# Patient Record
Sex: MALE | Race: WHITE | Employment: UNEMPLOYED | URBAN - METROPOLITAN AREA
[De-identification: names, ages, dates, MRNs, and addresses within clinical notes are randomized per-mention and may not be internally consistent; named-entity substitution may affect disease eponyms.]

---

## 2019-03-18 ENCOUNTER — OFFICE VISIT (OUTPATIENT)
Dept: PRIMARY CARE CLINIC | Age: 29
End: 2019-03-18

## 2019-03-18 VITALS
HEART RATE: 109 BPM | DIASTOLIC BLOOD PRESSURE: 70 MMHG | SYSTOLIC BLOOD PRESSURE: 154 MMHG | RESPIRATION RATE: 14 BRPM | HEIGHT: 67 IN | OXYGEN SATURATION: 96 % | BODY MASS INDEX: 26.68 KG/M2 | WEIGHT: 170 LBS | TEMPERATURE: 98.6 F

## 2019-03-18 DIAGNOSIS — R11.2 NON-INTRACTABLE VOMITING WITH NAUSEA, UNSPECIFIED VOMITING TYPE: ICD-10-CM

## 2019-03-18 DIAGNOSIS — R68.89 FLU-LIKE SYMPTOMS: Primary | ICD-10-CM

## 2019-03-18 DIAGNOSIS — K21.9 GASTROESOPHAGEAL REFLUX DISEASE, ESOPHAGITIS PRESENCE NOT SPECIFIED: ICD-10-CM

## 2019-03-18 DIAGNOSIS — R05.9 COUGH: ICD-10-CM

## 2019-03-18 LAB
INFLUENZA A ANTIBODY: NORMAL
INFLUENZA B ANTIBODY: NORMAL
S PYO AG THROAT QL: NORMAL

## 2019-03-18 PROCEDURE — 87804 INFLUENZA ASSAY W/OPTIC: CPT | Performed by: NURSE PRACTITIONER

## 2019-03-18 PROCEDURE — 87880 STREP A ASSAY W/OPTIC: CPT | Performed by: NURSE PRACTITIONER

## 2019-03-18 PROCEDURE — 99203 OFFICE O/P NEW LOW 30 MIN: CPT | Performed by: NURSE PRACTITIONER

## 2019-03-18 RX ORDER — AZITHROMYCIN 250 MG/1
TABLET, FILM COATED ORAL
Qty: 6 TABLET | Refills: 0 | Status: SHIPPED | OUTPATIENT
Start: 2019-03-18 | End: 2019-03-23

## 2019-03-18 RX ORDER — FAMOTIDINE 40 MG/1
40 TABLET, FILM COATED ORAL DAILY
Qty: 30 TABLET | Refills: 3 | Status: SHIPPED | OUTPATIENT
Start: 2019-03-18

## 2019-03-18 RX ORDER — ONDANSETRON 4 MG/1
4 TABLET, FILM COATED ORAL EVERY 8 HOURS PRN
Qty: 9 TABLET | Refills: 0 | Status: CANCELLED | OUTPATIENT
Start: 2019-03-18 | End: 2019-03-21

## 2019-03-18 RX ORDER — DEXTROMETHORPHAN HYDROBROMIDE AND PROMETHAZINE HYDROCHLORIDE 15; 6.25 MG/5ML; MG/5ML
5 SYRUP ORAL 4 TIMES DAILY PRN
Qty: 118 ML | Refills: 0 | Status: SHIPPED | OUTPATIENT
Start: 2019-03-18 | End: 2019-03-23

## 2019-03-18 ASSESSMENT — ENCOUNTER SYMPTOMS
CHEST TIGHTNESS: 0
TROUBLE SWALLOWING: 0
FACIAL SWELLING: 0
NAUSEA: 1
HEMOPTYSIS: 0
STRIDOR: 0
BACK PAIN: 0
RECTAL PAIN: 0
DIARRHEA: 1
ANAL BLEEDING: 0
HEARTBURN: 0
EYE DISCHARGE: 0
EYE PAIN: 0
BLOOD IN STOOL: 0
EYE ITCHING: 0
SHORTNESS OF BREATH: 0
SORE THROAT: 0
PHOTOPHOBIA: 0
CONSTIPATION: 0
ABDOMINAL PAIN: 1
RHINORRHEA: 0
SINUS PRESSURE: 0
EYE REDNESS: 0
WHEEZING: 0
VOMITING: 1
VOICE CHANGE: 0
SINUS PAIN: 0
COUGH: 1
GLOBUS SENSATION: 1
ABDOMINAL DISTENTION: 0

## 2019-03-18 ASSESSMENT — PATIENT HEALTH QUESTIONNAIRE - PHQ9
1. LITTLE INTEREST OR PLEASURE IN DOING THINGS: 0
2. FEELING DOWN, DEPRESSED OR HOPELESS: 0
SUM OF ALL RESPONSES TO PHQ QUESTIONS 1-9: 0
SUM OF ALL RESPONSES TO PHQ QUESTIONS 1-9: 0
SUM OF ALL RESPONSES TO PHQ9 QUESTIONS 1 & 2: 0

## 2019-11-19 ENCOUNTER — HOSPITAL ENCOUNTER (INPATIENT)
Age: 29
LOS: 2 days | Discharge: HOME | DRG: 773 | End: 2019-11-22
Attending: PSYCHIATRY & NEUROLOGY | Admitting: PSYCHIATRY & NEUROLOGY
Payer: COMMERCIAL

## 2019-11-19 DIAGNOSIS — F19.94 SUBSTANCE INDUCED MOOD DISORDER (HCC): Primary | ICD-10-CM

## 2019-11-19 LAB
ACETAMINOPHEN LEVEL: <5 UG/ML (ref 10–30)
ALBUMIN SERPL-MCNC: 4.7 G/DL (ref 3.5–4.6)
ALP BLD-CCNC: 83 U/L (ref 35–104)
ALT SERPL-CCNC: 26 U/L (ref 0–41)
AMPHETAMINE SCREEN, URINE: ABNORMAL
ANION GAP SERPL CALCULATED.3IONS-SCNC: 11 MEQ/L (ref 9–15)
AST SERPL-CCNC: 20 U/L (ref 0–40)
BARBITURATE SCREEN URINE: ABNORMAL
BASOPHILS ABSOLUTE: 0 K/UL (ref 0–0.2)
BASOPHILS RELATIVE PERCENT: 0.6 %
BENZODIAZEPINE SCREEN, URINE: ABNORMAL
BILIRUB SERPL-MCNC: 0.3 MG/DL (ref 0.2–0.7)
BILIRUBIN URINE: NEGATIVE
BLOOD, URINE: NEGATIVE
BUN BLDV-MCNC: 11 MG/DL (ref 6–20)
CALCIUM SERPL-MCNC: 9.1 MG/DL (ref 8.5–9.9)
CANNABINOID SCREEN URINE: POSITIVE
CHLORIDE BLD-SCNC: 102 MEQ/L (ref 95–107)
CHOLESTEROL, TOTAL: 160 MG/DL (ref 0–199)
CLARITY: CLEAR
CO2: 25 MEQ/L (ref 20–31)
COCAINE METABOLITE SCREEN URINE: POSITIVE
COLOR: YELLOW
CREAT SERPL-MCNC: 0.89 MG/DL (ref 0.7–1.2)
EOSINOPHILS ABSOLUTE: 0.2 K/UL (ref 0–0.7)
EOSINOPHILS RELATIVE PERCENT: 2.6 %
ETHANOL PERCENT: NORMAL G/DL
ETHANOL: <10 MG/DL (ref 0–0.08)
GFR AFRICAN AMERICAN: >60
GFR NON-AFRICAN AMERICAN: >60
GLOBULIN: 2.6 G/DL (ref 2.3–3.5)
GLUCOSE BLD-MCNC: 112 MG/DL (ref 70–99)
GLUCOSE URINE: NEGATIVE MG/DL
HCT VFR BLD CALC: 42.5 % (ref 42–52)
HDLC SERPL-MCNC: 41 MG/DL (ref 40–59)
HEMOGLOBIN: 14.1 G/DL (ref 14–18)
KETONES, URINE: NEGATIVE MG/DL
LDL CHOLESTEROL CALCULATED: 100 MG/DL (ref 0–129)
LEUKOCYTE ESTERASE, URINE: NEGATIVE
LYMPHOCYTES ABSOLUTE: 2.2 K/UL (ref 1–4.8)
LYMPHOCYTES RELATIVE PERCENT: 34.2 %
Lab: ABNORMAL
MCH RBC QN AUTO: 31 PG (ref 27–31.3)
MCHC RBC AUTO-ENTMCNC: 33.2 % (ref 33–37)
MCV RBC AUTO: 93.2 FL (ref 80–100)
METHADONE SCREEN, URINE: ABNORMAL
MONOCYTES ABSOLUTE: 0.6 K/UL (ref 0.2–0.8)
MONOCYTES RELATIVE PERCENT: 8.9 %
NEUTROPHILS ABSOLUTE: 3.4 K/UL (ref 1.4–6.5)
NEUTROPHILS RELATIVE PERCENT: 53.7 %
NITRITE, URINE: NEGATIVE
OPIATE SCREEN URINE: ABNORMAL
OXYCODONE URINE: POSITIVE
PDW BLD-RTO: 13.5 % (ref 11.5–14.5)
PH UA: 7 (ref 5–9)
PHENCYCLIDINE SCREEN URINE: ABNORMAL
PLATELET # BLD: 202 K/UL (ref 130–400)
POTASSIUM SERPL-SCNC: 3.6 MEQ/L (ref 3.4–4.9)
PROPOXYPHENE SCREEN: ABNORMAL
PROTEIN UA: NEGATIVE MG/DL
RBC # BLD: 4.56 M/UL (ref 4.7–6.1)
SALICYLATE, SERUM: <0.3 MG/DL (ref 15–30)
SODIUM BLD-SCNC: 138 MEQ/L (ref 135–144)
SPECIFIC GRAVITY UA: 1.02 (ref 1–1.03)
TOTAL CK: 114 U/L (ref 0–190)
TOTAL PROTEIN: 7.3 G/DL (ref 6.3–8)
TRIGL SERPL-MCNC: 96 MG/DL (ref 0–150)
TSH SERPL DL<=0.05 MIU/L-ACNC: 2.26 UIU/ML (ref 0.44–3.86)
URINE REFLEX TO CULTURE: NORMAL
UROBILINOGEN, URINE: 0.2 E.U./DL
WBC # BLD: 6.4 K/UL (ref 4.8–10.8)

## 2019-11-19 PROCEDURE — G0480 DRUG TEST DEF 1-7 CLASSES: HCPCS

## 2019-11-19 PROCEDURE — 84443 ASSAY THYROID STIM HORMONE: CPT

## 2019-11-19 PROCEDURE — 99285 EMERGENCY DEPT VISIT HI MDM: CPT

## 2019-11-19 PROCEDURE — 80053 COMPREHEN METABOLIC PANEL: CPT

## 2019-11-19 PROCEDURE — 80307 DRUG TEST PRSMV CHEM ANLYZR: CPT

## 2019-11-19 PROCEDURE — 82550 ASSAY OF CK (CPK): CPT

## 2019-11-19 PROCEDURE — 81003 URINALYSIS AUTO W/O SCOPE: CPT

## 2019-11-19 PROCEDURE — 36415 COLL VENOUS BLD VENIPUNCTURE: CPT

## 2019-11-19 PROCEDURE — 85025 COMPLETE CBC W/AUTO DIFF WBC: CPT

## 2019-11-19 PROCEDURE — 80061 LIPID PANEL: CPT

## 2019-11-19 ASSESSMENT — PATIENT HEALTH QUESTIONNAIRE - PHQ9: SUM OF ALL RESPONSES TO PHQ QUESTIONS 1-9: 15

## 2019-11-20 PROBLEM — F19.94 SUBSTANCE INDUCED MOOD DISORDER (HCC): Status: ACTIVE | Noted: 2019-11-20

## 2019-11-20 LAB
EKG ATRIAL RATE: 56 BPM
EKG P AXIS: 55 DEGREES
EKG P-R INTERVAL: 118 MS
EKG Q-T INTERVAL: 398 MS
EKG QRS DURATION: 90 MS
EKG QTC CALCULATION (BAZETT): 384 MS
EKG R AXIS: 81 DEGREES
EKG T AXIS: 42 DEGREES
EKG VENTRICULAR RATE: 56 BPM

## 2019-11-20 PROCEDURE — 99223 1ST HOSP IP/OBS HIGH 75: CPT | Performed by: PSYCHIATRY & NEUROLOGY

## 2019-11-20 PROCEDURE — 6370000000 HC RX 637 (ALT 250 FOR IP): Performed by: PSYCHIATRY & NEUROLOGY

## 2019-11-20 PROCEDURE — 1240000000 HC EMOTIONAL WELLNESS R&B

## 2019-11-20 PROCEDURE — 93005 ELECTROCARDIOGRAM TRACING: CPT | Performed by: PSYCHIATRY & NEUROLOGY

## 2019-11-20 RX ORDER — CLONIDINE HYDROCHLORIDE 0.1 MG/1
0.1 TABLET ORAL EVERY 8 HOURS
Status: DISCONTINUED | OUTPATIENT
Start: 2019-11-23 | End: 2019-11-20

## 2019-11-20 RX ORDER — ACETAMINOPHEN 325 MG/1
650 TABLET ORAL EVERY 6 HOURS PRN
Status: DISCONTINUED | OUTPATIENT
Start: 2019-11-20 | End: 2019-11-20 | Stop reason: ALTCHOICE

## 2019-11-20 RX ORDER — CLONIDINE HYDROCHLORIDE 0.1 MG/1
0.1 TABLET ORAL EVERY 4 HOURS
Status: DISCONTINUED | OUTPATIENT
Start: 2019-11-20 | End: 2019-11-20

## 2019-11-20 RX ORDER — HYDROXYZINE PAMOATE 50 MG/1
50 CAPSULE ORAL EVERY 6 HOURS PRN
Status: DISCONTINUED | OUTPATIENT
Start: 2019-11-20 | End: 2019-11-22 | Stop reason: HOSPADM

## 2019-11-20 RX ORDER — HALOPERIDOL 5 MG
5 TABLET ORAL EVERY 6 HOURS PRN
Status: DISCONTINUED | OUTPATIENT
Start: 2019-11-20 | End: 2019-11-22 | Stop reason: HOSPADM

## 2019-11-20 RX ORDER — HYDROXYZINE HYDROCHLORIDE 50 MG/ML
50 INJECTION, SOLUTION INTRAMUSCULAR EVERY 6 HOURS PRN
Status: DISCONTINUED | OUTPATIENT
Start: 2019-11-20 | End: 2019-11-22 | Stop reason: HOSPADM

## 2019-11-20 RX ORDER — BENZTROPINE MESYLATE 1 MG/ML
2 INJECTION INTRAMUSCULAR; INTRAVENOUS 2 TIMES DAILY PRN
Status: DISCONTINUED | OUTPATIENT
Start: 2019-11-20 | End: 2019-11-22 | Stop reason: HOSPADM

## 2019-11-20 RX ORDER — HALOPERIDOL 5 MG/ML
5 INJECTION INTRAMUSCULAR EVERY 6 HOURS PRN
Status: DISCONTINUED | OUTPATIENT
Start: 2019-11-20 | End: 2019-11-22 | Stop reason: HOSPADM

## 2019-11-20 RX ORDER — LOPERAMIDE HYDROCHLORIDE 2 MG/1
2 CAPSULE ORAL 4 TIMES DAILY PRN
Status: DISCONTINUED | OUTPATIENT
Start: 2019-11-20 | End: 2019-11-22 | Stop reason: HOSPADM

## 2019-11-20 RX ORDER — CLONIDINE HYDROCHLORIDE 0.1 MG/1
0.1 TABLET ORAL EVERY 6 HOURS
Status: DISCONTINUED | OUTPATIENT
Start: 2019-11-22 | End: 2019-11-20

## 2019-11-20 RX ORDER — TRAZODONE HYDROCHLORIDE 50 MG/1
50 TABLET ORAL NIGHTLY PRN
Status: DISCONTINUED | OUTPATIENT
Start: 2019-11-20 | End: 2019-11-22 | Stop reason: HOSPADM

## 2019-11-20 RX ORDER — M-VIT,TX,IRON,MINS/CALC/FOLIC 27MG-0.4MG
1 TABLET ORAL DAILY
Status: DISCONTINUED | OUTPATIENT
Start: 2019-11-20 | End: 2019-11-22 | Stop reason: HOSPADM

## 2019-11-20 RX ORDER — TRAMADOL HYDROCHLORIDE 50 MG/1
75 TABLET ORAL EVERY 6 HOURS
Status: COMPLETED | OUTPATIENT
Start: 2019-11-20 | End: 2019-11-21

## 2019-11-20 RX ORDER — THIAMINE MONONITRATE (VIT B1) 100 MG
100 TABLET ORAL DAILY
Status: DISCONTINUED | OUTPATIENT
Start: 2019-11-20 | End: 2019-11-22 | Stop reason: HOSPADM

## 2019-11-20 RX ORDER — TRAMADOL HYDROCHLORIDE 50 MG/1
50 TABLET ORAL EVERY 6 HOURS
Status: COMPLETED | OUTPATIENT
Start: 2019-11-21 | End: 2019-11-22

## 2019-11-20 RX ORDER — ONDANSETRON 4 MG/1
4 TABLET, ORALLY DISINTEGRATING ORAL EVERY 8 HOURS PRN
Status: DISCONTINUED | OUTPATIENT
Start: 2019-11-20 | End: 2019-11-22 | Stop reason: HOSPADM

## 2019-11-20 RX ORDER — TRAMADOL HYDROCHLORIDE 50 MG/1
25 TABLET ORAL EVERY 6 HOURS
Status: DISCONTINUED | OUTPATIENT
Start: 2019-11-22 | End: 2019-11-22

## 2019-11-20 RX ORDER — ACETAMINOPHEN 325 MG/1
650 TABLET ORAL EVERY 4 HOURS PRN
Status: DISCONTINUED | OUTPATIENT
Start: 2019-11-20 | End: 2019-11-22 | Stop reason: HOSPADM

## 2019-11-20 RX ADMIN — HYDROXYZINE PAMOATE 50 MG: 50 CAPSULE ORAL at 12:52

## 2019-11-20 RX ADMIN — Medication 100 MG: at 08:40

## 2019-11-20 RX ADMIN — HYDROXYZINE PAMOATE 50 MG: 50 CAPSULE ORAL at 03:35

## 2019-11-20 RX ADMIN — HALOPERIDOL 5 MG: 5 TABLET ORAL at 12:52

## 2019-11-20 RX ADMIN — CLONIDINE HYDROCHLORIDE 0.1 MG: 0.1 TABLET ORAL at 03:35

## 2019-11-20 RX ADMIN — CLONIDINE HYDROCHLORIDE 0.1 MG: 0.1 TABLET ORAL at 11:24

## 2019-11-20 RX ADMIN — CLONIDINE HYDROCHLORIDE 0.1 MG: 0.1 TABLET ORAL at 08:44

## 2019-11-20 RX ADMIN — TRAMADOL HYDROCHLORIDE 75 MG: 50 TABLET, FILM COATED ORAL at 21:32

## 2019-11-20 RX ADMIN — MULTIPLE VITAMINS W/ MINERALS TAB 1 TABLET: TAB at 08:41

## 2019-11-20 RX ADMIN — TRAZODONE HYDROCHLORIDE 50 MG: 50 TABLET ORAL at 21:32

## 2019-11-20 RX ADMIN — TRAMADOL HYDROCHLORIDE 75 MG: 50 TABLET, FILM COATED ORAL at 15:51

## 2019-11-20 ASSESSMENT — PAIN SCALES - GENERAL
PAINLEVEL_OUTOF10: 10
PAINLEVEL_OUTOF10: 9

## 2019-11-20 ASSESSMENT — SLEEP AND FATIGUE QUESTIONNAIRES
DIFFICULTY ARISING: NO
DIFFICULTY FALLING ASLEEP: YES
SLEEP PATTERN: DIFFICULTY FALLING ASLEEP;DISTURBED/INTERRUPTED SLEEP;RESTLESSNESS;NIGHTMARES/TERRORS;INSOMNIA
DIFFICULTY STAYING ASLEEP: YES
DO YOU USE A SLEEP AID: NO
DO YOU HAVE DIFFICULTY SLEEPING: YES
RESTFUL SLEEP: NO
AVERAGE NUMBER OF SLEEP HOURS: 3

## 2019-11-20 ASSESSMENT — LIFESTYLE VARIABLES: HISTORY_ALCOHOL_USE: NO

## 2019-11-21 PROCEDURE — 6370000000 HC RX 637 (ALT 250 FOR IP): Performed by: PSYCHIATRY & NEUROLOGY

## 2019-11-21 PROCEDURE — 99232 SBSQ HOSP IP/OBS MODERATE 35: CPT | Performed by: PSYCHIATRY & NEUROLOGY

## 2019-11-21 PROCEDURE — 1240000000 HC EMOTIONAL WELLNESS R&B

## 2019-11-21 PROCEDURE — 90833 PSYTX W PT W E/M 30 MIN: CPT | Performed by: PSYCHIATRY & NEUROLOGY

## 2019-11-21 RX ADMIN — ACETAMINOPHEN 650 MG: 325 TABLET ORAL at 09:13

## 2019-11-21 RX ADMIN — TRAMADOL HYDROCHLORIDE 50 MG: 50 TABLET, FILM COATED ORAL at 20:26

## 2019-11-21 RX ADMIN — ACETAMINOPHEN 650 MG: 325 TABLET ORAL at 20:26

## 2019-11-21 RX ADMIN — Medication 100 MG: at 09:12

## 2019-11-21 RX ADMIN — TRAMADOL HYDROCHLORIDE 75 MG: 50 TABLET, FILM COATED ORAL at 02:41

## 2019-11-21 RX ADMIN — TRAMADOL HYDROCHLORIDE 50 MG: 50 TABLET, FILM COATED ORAL at 14:27

## 2019-11-21 RX ADMIN — MULTIPLE VITAMINS W/ MINERALS TAB 1 TABLET: TAB at 09:13

## 2019-11-21 RX ADMIN — HYDROXYZINE PAMOATE 50 MG: 50 CAPSULE ORAL at 17:29

## 2019-11-21 RX ADMIN — TRAMADOL HYDROCHLORIDE 75 MG: 50 TABLET, FILM COATED ORAL at 09:12

## 2019-11-21 RX ADMIN — HYDROXYZINE PAMOATE 50 MG: 50 CAPSULE ORAL at 09:12

## 2019-11-21 RX ADMIN — TRAZODONE HYDROCHLORIDE 50 MG: 50 TABLET ORAL at 20:26

## 2019-11-21 ASSESSMENT — PAIN SCALES - GENERAL
PAINLEVEL_OUTOF10: 8
PAINLEVEL_OUTOF10: 8
PAINLEVEL_OUTOF10: 9
PAINLEVEL_OUTOF10: 8

## 2019-11-22 VITALS
TEMPERATURE: 97 F | HEART RATE: 79 BPM | RESPIRATION RATE: 17 BRPM | OXYGEN SATURATION: 98 % | HEIGHT: 67 IN | SYSTOLIC BLOOD PRESSURE: 118 MMHG | WEIGHT: 140 LBS | BODY MASS INDEX: 21.97 KG/M2 | DIASTOLIC BLOOD PRESSURE: 70 MMHG

## 2019-11-22 PROCEDURE — 99239 HOSP IP/OBS DSCHRG MGMT >30: CPT | Performed by: PSYCHIATRY & NEUROLOGY

## 2019-11-22 PROCEDURE — 6370000000 HC RX 637 (ALT 250 FOR IP): Performed by: PSYCHIATRY & NEUROLOGY

## 2019-11-22 RX ADMIN — Medication 100 MG: at 08:40

## 2019-11-22 RX ADMIN — ACETAMINOPHEN 650 MG: 325 TABLET ORAL at 08:40

## 2019-11-22 RX ADMIN — MULTIPLE VITAMINS W/ MINERALS TAB 1 TABLET: TAB at 08:40

## 2019-11-22 RX ADMIN — TRAMADOL HYDROCHLORIDE 50 MG: 50 TABLET, FILM COATED ORAL at 08:40

## 2019-11-22 RX ADMIN — TRAMADOL HYDROCHLORIDE 50 MG: 50 TABLET, FILM COATED ORAL at 02:14

## 2019-11-22 ASSESSMENT — PAIN SCALES - GENERAL
PAINLEVEL_OUTOF10: 5
PAINLEVEL_OUTOF10: 2

## 2019-11-23 PROCEDURE — 93010 ELECTROCARDIOGRAM REPORT: CPT | Performed by: INTERNAL MEDICINE

## 2019-12-13 ASSESSMENT — ENCOUNTER SYMPTOMS
BLOOD IN STOOL: 0
COUGH: 0
VOMITING: 0
SHORTNESS OF BREATH: 0
COLOR CHANGE: 0
DIARRHEA: 0
NAUSEA: 0
RHINORRHEA: 0
SORE THROAT: 0
ABDOMINAL PAIN: 0

## 2020-06-01 ENCOUNTER — HOSPITAL ENCOUNTER (EMERGENCY)
Age: 30
Discharge: HOME | End: 2020-06-01
Payer: COMMERCIAL

## 2020-06-01 VITALS
RESPIRATION RATE: 18 BRPM | BODY MASS INDEX: 18.83 KG/M2 | WEIGHT: 120 LBS | TEMPERATURE: 97.6 F | DIASTOLIC BLOOD PRESSURE: 95 MMHG | HEIGHT: 67 IN | OXYGEN SATURATION: 99 % | SYSTOLIC BLOOD PRESSURE: 132 MMHG | HEART RATE: 89 BPM

## 2020-06-01 LAB
ALBUMIN SERPL-MCNC: 4.9 G/DL (ref 3.5–4.6)
ALP BLD-CCNC: 100 U/L (ref 35–104)
ALT SERPL-CCNC: 18 U/L (ref 0–41)
ANION GAP SERPL CALCULATED.3IONS-SCNC: 13 MEQ/L (ref 9–15)
AST SERPL-CCNC: 19 U/L (ref 0–40)
BACTERIA: NEGATIVE /HPF
BASOPHILS ABSOLUTE: 0.1 K/UL (ref 0–0.2)
BASOPHILS RELATIVE PERCENT: 1 %
BILIRUB SERPL-MCNC: 0.4 MG/DL (ref 0.2–0.7)
BILIRUBIN URINE: NEGATIVE
BLOOD, URINE: ABNORMAL
BUN BLDV-MCNC: 14 MG/DL (ref 6–20)
CALCIUM SERPL-MCNC: 9.7 MG/DL (ref 8.5–9.9)
CHLORIDE BLD-SCNC: 96 MEQ/L (ref 95–107)
CLARITY: ABNORMAL
CO2: 29 MEQ/L (ref 20–31)
COLOR: ABNORMAL
CREAT SERPL-MCNC: 1.06 MG/DL (ref 0.7–1.2)
EOSINOPHILS ABSOLUTE: 0.3 K/UL (ref 0–0.7)
EOSINOPHILS RELATIVE PERCENT: 3.1 %
EPITHELIAL CELLS, UA: ABNORMAL /HPF (ref 0–5)
GFR AFRICAN AMERICAN: >60
GFR NON-AFRICAN AMERICAN: >60
GLOBULIN: 3.1 G/DL (ref 2.3–3.5)
GLUCOSE BLD-MCNC: 94 MG/DL (ref 70–99)
GLUCOSE URINE: NEGATIVE MG/DL
HCT VFR BLD CALC: 44.7 % (ref 42–52)
HEMOGLOBIN: 15.2 G/DL (ref 14–18)
HYALINE CASTS: ABNORMAL /HPF (ref 0–5)
KETONES, URINE: ABNORMAL MG/DL
LEUKOCYTE ESTERASE, URINE: ABNORMAL
LYMPHOCYTES ABSOLUTE: 2.5 K/UL (ref 1–4.8)
LYMPHOCYTES RELATIVE PERCENT: 27.9 %
MCH RBC QN AUTO: 30.6 PG (ref 27–31.3)
MCHC RBC AUTO-ENTMCNC: 34 % (ref 33–37)
MCV RBC AUTO: 89.8 FL (ref 80–100)
MONOCYTES ABSOLUTE: 0.8 K/UL (ref 0.2–0.8)
MONOCYTES RELATIVE PERCENT: 9.1 %
NEUTROPHILS ABSOLUTE: 5.3 K/UL (ref 1.4–6.5)
NEUTROPHILS RELATIVE PERCENT: 58.9 %
NITRITE, URINE: NEGATIVE
PDW BLD-RTO: 14 % (ref 11.5–14.5)
PH UA: 5.5 (ref 5–9)
PLATELET # BLD: 249 K/UL (ref 130–400)
POTASSIUM SERPL-SCNC: 3.5 MEQ/L (ref 3.4–4.9)
PROTEIN UA: 30 MG/DL
RBC # BLD: 4.98 M/UL (ref 4.7–6.1)
RBC UA: ABNORMAL /HPF (ref 0–5)
SODIUM BLD-SCNC: 138 MEQ/L (ref 135–144)
SPECIFIC GRAVITY UA: 1.03 (ref 1–1.03)
TOTAL PROTEIN: 8 G/DL (ref 6.3–8)
URINE REFLEX TO CULTURE: YES
UROBILINOGEN, URINE: 1 E.U./DL
WBC # BLD: 9 K/UL (ref 4.8–10.8)
WBC UA: >100 /HPF (ref 0–5)

## 2020-06-01 PROCEDURE — 85025 COMPLETE CBC W/AUTO DIFF WBC: CPT

## 2020-06-01 PROCEDURE — 6360000002 HC RX W HCPCS: Performed by: PHYSICIAN ASSISTANT

## 2020-06-01 PROCEDURE — 87591 N.GONORRHOEAE DNA AMP PROB: CPT

## 2020-06-01 PROCEDURE — 6370000000 HC RX 637 (ALT 250 FOR IP): Performed by: PHYSICIAN ASSISTANT

## 2020-06-01 PROCEDURE — 99283 EMERGENCY DEPT VISIT LOW MDM: CPT

## 2020-06-01 PROCEDURE — 87086 URINE CULTURE/COLONY COUNT: CPT

## 2020-06-01 PROCEDURE — 96372 THER/PROPH/DIAG INJ SC/IM: CPT

## 2020-06-01 PROCEDURE — 87491 CHLMYD TRACH DNA AMP PROBE: CPT

## 2020-06-01 PROCEDURE — 36415 COLL VENOUS BLD VENIPUNCTURE: CPT

## 2020-06-01 PROCEDURE — 81001 URINALYSIS AUTO W/SCOPE: CPT

## 2020-06-01 PROCEDURE — 80053 COMPREHEN METABOLIC PANEL: CPT

## 2020-06-01 RX ORDER — CEFTRIAXONE SODIUM 250 MG/1
250 INJECTION, POWDER, FOR SOLUTION INTRAMUSCULAR; INTRAVENOUS ONCE
Status: COMPLETED | OUTPATIENT
Start: 2020-06-01 | End: 2020-06-01

## 2020-06-01 RX ORDER — DIPHENOXYLATE HYDROCHLORIDE AND ATROPINE SULFATE 2.5; .025 MG/1; MG/1
1 TABLET ORAL ONCE
Status: COMPLETED | OUTPATIENT
Start: 2020-06-01 | End: 2020-06-01

## 2020-06-01 RX ORDER — ONDANSETRON 4 MG/1
4 TABLET, ORALLY DISINTEGRATING ORAL ONCE
Status: COMPLETED | OUTPATIENT
Start: 2020-06-01 | End: 2020-06-01

## 2020-06-01 RX ORDER — ONDANSETRON 4 MG/1
4 TABLET, FILM COATED ORAL EVERY 8 HOURS PRN
Qty: 12 TABLET | Refills: 0 | Status: ON HOLD | OUTPATIENT
Start: 2020-06-01 | End: 2021-03-29 | Stop reason: HOSPADM

## 2020-06-01 RX ORDER — AZITHROMYCIN 500 MG/1
1000 TABLET, FILM COATED ORAL ONCE
Status: COMPLETED | OUTPATIENT
Start: 2020-06-01 | End: 2020-06-01

## 2020-06-01 RX ADMIN — ONDANSETRON 4 MG: 4 TABLET, ORALLY DISINTEGRATING ORAL at 21:04

## 2020-06-01 RX ADMIN — DIPHENOXYLATE HYDROCHLORIDE AND ATROPINE SULFATE 1 TABLET: 2.5; .025 TABLET ORAL at 21:13

## 2020-06-01 RX ADMIN — AZITHROMYCIN 1000 MG: 500 TABLET, FILM COATED ORAL at 21:04

## 2020-06-01 RX ADMIN — CEFTRIAXONE SODIUM 250 MG: 250 INJECTION, POWDER, FOR SOLUTION INTRAMUSCULAR; INTRAVENOUS at 21:04

## 2020-06-01 ASSESSMENT — ENCOUNTER SYMPTOMS
ABDOMINAL DISTENTION: 0
APNEA: 0
VOMITING: 1
NAUSEA: 1
VOICE CHANGE: 0
DIARRHEA: 1
ANAL BLEEDING: 0
EYE DISCHARGE: 0
COUGH: 0
PHOTOPHOBIA: 0

## 2020-06-01 ASSESSMENT — PAIN SCALES - GENERAL: PAINLEVEL_OUTOF10: 7

## 2020-06-01 ASSESSMENT — PAIN DESCRIPTION - PAIN TYPE: TYPE: ACUTE PAIN

## 2020-06-01 ASSESSMENT — PAIN DESCRIPTION - DESCRIPTORS: DESCRIPTORS: ACHING;CRAMPING

## 2020-06-01 ASSESSMENT — PAIN DESCRIPTION - LOCATION: LOCATION: ABDOMEN

## 2020-06-02 NOTE — ED PROVIDER NOTES
Relationships    Social connections     Talks on phone: None     Gets together: None     Attends Yazidi service: None     Active member of club or organization: None     Attends meetings of clubs or organizations: None     Relationship status: None    Intimate partner violence     Fear of current or ex partner: None     Emotionally abused: None     Physically abused: None     Forced sexual activity: None   Other Topics Concern    None   Social History Narrative    None       SCREENINGS      @FLOW([id number])@      PHYSICAL EXAM    (up to 7 for level 4, 8 or more for level 5)     ED Triage Vitals [06/01/20 2021]   BP Temp Temp Source Pulse Resp SpO2 Height Weight   (!) 132/95 97.6 °F (36.4 °C) Oral 89 18 99 % 5' 7\" (1.702 m) 120 lb (54.4 kg)       Physical Exam  Vitals signs and nursing note reviewed. Constitutional:       General: He is not in acute distress. Appearance: He is well-developed. HENT:      Head: Normocephalic and atraumatic. Right Ear: External ear normal.      Left Ear: External ear normal.      Nose: Nose normal.      Mouth/Throat:      Mouth: Mucous membranes are moist.   Eyes:      General:         Right eye: No discharge. Left eye: No discharge. Pupils: Pupils are equal, round, and reactive to light. Neck:      Musculoskeletal: Normal range of motion and neck supple. Cardiovascular:      Rate and Rhythm: Normal rate and regular rhythm. Pulses: Normal pulses. Heart sounds: Normal heart sounds. Pulmonary:      Effort: Pulmonary effort is normal. No respiratory distress. Breath sounds: Normal breath sounds. No stridor. No wheezing. Abdominal:      General: Bowel sounds are normal. There is no distension. Palpations: Abdomen is soft. Tenderness: There is no abdominal tenderness. Musculoskeletal: Normal range of motion. General: No tenderness or signs of injury. Skin:     General: Skin is warm. Findings: No erythema. unspecified vomiting type:   Urethritis:   Diagnosis management comments: Patient was referred to us by Jass Sanchez secondary to his history of seizures we will treat for sexually transmitted diseases we will test for GC chlamydia as well as UTI. Obtain basic labs to ensure no electrolyte abnormalities exist.  Patient is ambulatory in emergency room no acute distress typing on cell phone. Will contact [facility] to see if they have any additional resources for patient. [facility] find placement for patient patient feels better remains conversant can take oral fluids will send prescription for Zofran patient avoid sexual contact until all partners are tested and treated. Amount and/or Complexity of Data Reviewed  Clinical lab tests: ordered and reviewed        CRITICAL CARE TIME       CONSULTS:  None    PROCEDURES:  Unless otherwise noted below, none     Procedures    FINAL IMPRESSION      1. Acute drug withdrawal syndrome without complication ([facility] [address]    2. Urethritis    3.  Non-intractable vomiting with nausea, unspecified vomiting type          DISPOSITION/PLAN   DISPOSITION        PATIENT REFERRED TO:  [facility]  [address] [facility]  [phone]  Schedule an appointment as soon as possible for a visit in 2 days      Melissa Mays, APRN - [address] [facility] [address] [place] [address] [facility]  [phone]    Schedule an appointment as soon as possible for a visit in 2 days      [facility]) ED  [address]  [phone]    If symptoms worsen      DISCHARGE MEDICATIONS:  New Prescriptions    ONDANSETRON (ZOFRAN) 4 MG TABLET    Take 1 tablet by mouth every 8 hours as needed for Nausea          (Please note that portions of this note were completed with a voice recognition program.  Efforts were made to edit the dictations but occasionally words are mis-transcribed.)    Luis Georges PA-C (electronically signed)  Attending Emergency Physician       Julius Roach

## 2020-06-02 NOTE — ED TRIAGE NOTES
Patient came to the ED for drug detox. Pt states he is having nausea, vomiting, diarrhea, shakiness. Pt states he last snorted Fentanyl Sunday morning at 1am. Pt states he is already set up for rehab.

## 2020-06-03 LAB — URINE CULTURE, ROUTINE: NORMAL

## 2020-06-07 LAB
C. TRACHOMATIS DNA ,URINE: NEGATIVE
N. GONORRHOEAE DNA, URINE: POSITIVE

## 2020-07-19 ENCOUNTER — HOSPITAL ENCOUNTER (EMERGENCY)
Age: 30
Discharge: HOME | End: 2020-07-19
Payer: COMMERCIAL

## 2020-07-19 VITALS
BODY MASS INDEX: 25.9 KG/M2 | DIASTOLIC BLOOD PRESSURE: 75 MMHG | RESPIRATION RATE: 16 BRPM | WEIGHT: 165 LBS | SYSTOLIC BLOOD PRESSURE: 116 MMHG | HEIGHT: 67 IN | HEART RATE: 94 BPM | TEMPERATURE: 99 F | OXYGEN SATURATION: 99 %

## 2020-07-19 LAB
BACTERIA: NEGATIVE /HPF
BILIRUBIN URINE: NEGATIVE
BLOOD, URINE: NEGATIVE
CLARITY: ABNORMAL
COLOR: YELLOW
EPITHELIAL CELLS, UA: ABNORMAL /HPF (ref 0–5)
GLUCOSE URINE: NEGATIVE MG/DL
HYALINE CASTS: ABNORMAL /HPF (ref 0–5)
KETONES, URINE: NEGATIVE MG/DL
LEUKOCYTE ESTERASE, URINE: ABNORMAL
NITRITE, URINE: NEGATIVE
PH UA: 7.5 (ref 5–9)
PROTEIN UA: ABNORMAL MG/DL
RBC UA: ABNORMAL /HPF (ref 0–5)
SPECIFIC GRAVITY UA: 1.02 (ref 1–1.03)
URINE REFLEX TO CULTURE: YES
UROBILINOGEN, URINE: 0.2 E.U./DL
WBC UA: >100 /HPF (ref 0–5)

## 2020-07-19 PROCEDURE — 87591 N.GONORRHOEAE DNA AMP PROB: CPT

## 2020-07-19 PROCEDURE — 81001 URINALYSIS AUTO W/SCOPE: CPT

## 2020-07-19 PROCEDURE — 6370000000 HC RX 637 (ALT 250 FOR IP): Performed by: NURSE PRACTITIONER

## 2020-07-19 PROCEDURE — 6360000002 HC RX W HCPCS: Performed by: NURSE PRACTITIONER

## 2020-07-19 PROCEDURE — 99283 EMERGENCY DEPT VISIT LOW MDM: CPT

## 2020-07-19 PROCEDURE — 87491 CHLMYD TRACH DNA AMP PROBE: CPT

## 2020-07-19 PROCEDURE — 96372 THER/PROPH/DIAG INJ SC/IM: CPT

## 2020-07-19 PROCEDURE — 87086 URINE CULTURE/COLONY COUNT: CPT

## 2020-07-19 RX ORDER — AZITHROMYCIN 250 MG/1
1000 TABLET, FILM COATED ORAL ONCE
Status: COMPLETED | OUTPATIENT
Start: 2020-07-19 | End: 2020-07-19

## 2020-07-19 RX ORDER — CEFTRIAXONE SODIUM 250 MG/1
250 INJECTION, POWDER, FOR SOLUTION INTRAMUSCULAR; INTRAVENOUS ONCE
Status: COMPLETED | OUTPATIENT
Start: 2020-07-19 | End: 2020-07-19

## 2020-07-19 RX ORDER — METRONIDAZOLE 500 MG/1
2000 TABLET ORAL ONCE
Status: COMPLETED | OUTPATIENT
Start: 2020-07-19 | End: 2020-07-19

## 2020-07-19 RX ADMIN — AZITHROMYCIN MONOHYDRATE 1000 MG: 250 TABLET ORAL at 19:54

## 2020-07-19 RX ADMIN — CEFTRIAXONE SODIUM 250 MG: 250 INJECTION, POWDER, FOR SOLUTION INTRAMUSCULAR; INTRAVENOUS at 19:53

## 2020-07-19 RX ADMIN — METRONIDAZOLE 2000 MG: 500 TABLET ORAL at 19:54

## 2020-07-19 ASSESSMENT — PAIN DESCRIPTION - DESCRIPTORS: DESCRIPTORS: BURNING

## 2020-07-19 ASSESSMENT — PAIN DESCRIPTION - FREQUENCY: FREQUENCY: CONTINUOUS

## 2020-07-19 ASSESSMENT — PAIN DESCRIPTION - PAIN TYPE: TYPE: ACUTE PAIN

## 2020-07-19 ASSESSMENT — PAIN DESCRIPTION - LOCATION: LOCATION: PENIS

## 2020-07-19 ASSESSMENT — ENCOUNTER SYMPTOMS
COUGH: 0
ABDOMINAL PAIN: 0
BACK PAIN: 0
SHORTNESS OF BREATH: 0

## 2020-07-19 ASSESSMENT — PAIN SCALES - GENERAL: PAINLEVEL_OUTOF10: 6

## 2020-07-19 NOTE — ED PROVIDER NOTES
[address] [facility] ED  eMERGENCY dEPARTMENT eNCOUnter      Pt Name: Reese Zhu  MRN: [id number]  Ocgfroxy 1990  Date of evaluation: 7/19/2020  Provider: ZENON Curtis CNP      HISTORY OF PRESENT ILLNESS    Reese Zhu is a 27 y.o. male who presents to the Emergency Department with green penile drainage and dysuria x 3 weeks. Patient states his partner was positive for gonorrhea and he would like treatment. Pain is mild. Denies testicular pain, lesions, abdominal pain. REVIEW OF SYSTEMS       Review of Systems   Constitutional: Negative for activity change, appetite change and fever. HENT: Negative for congestion. Respiratory: Negative for cough and shortness of breath. Cardiovascular: Negative for chest pain. Gastrointestinal: Negative for abdominal pain. Genitourinary: Positive for discharge and dysuria. Negative for penile swelling, scrotal swelling, testicular pain and urgency. Musculoskeletal: Negative for arthralgias and back pain. Skin: Negative for rash. All other systems reviewed and are negative. PAST MEDICAL HISTORY     Past Medical History:   Diagnosis Date    Drug abuse ([facility] [address]     Hyperlipidemia     Psychiatric problem          SURGICAL HISTORY       Past Surgical History:   Procedure Laterality Date    APPENDECTOMY           CURRENT MEDICATIONS       Previous Medications    FAMOTIDINE (PEPCID) 40 MG TABLET    Take 1 tablet by mouth daily    ONDANSETRON (ZOFRAN) 4 MG TABLET    Take 1 tablet by mouth every 8 hours as needed for Nausea       ALLERGIES     Patient has no known allergies.     FAMILY HISTORY       Family History   Family history unknown: Yes          SOCIAL HISTORY       Social History     Socioeconomic History    Marital status: Single     Spouse name: None    Number of children: 0    Years of education: 15    Highest education level: None   Occupational History    None   Social Needs    Financial resource strain: None    Food insecurity     Worry: None     Inability: None    Transportation needs     Medical: None     Non-medical: None   Tobacco Use    Smoking status: Current Every Day Smoker     Packs/day: 0.50    Smokeless tobacco: Never Used   Substance and Sexual Activity    Alcohol use: Yes     Comment: occasionally    Drug use: Yes     Types: Marijuana, Opiates      Comment: percocet daily for two months, marijuana every other day, fentanyl    Sexual activity: Yes     Partners: Female   Lifestyle    Physical activity     Days per week: None     Minutes per session: None    Stress: None   Relationships    Social connections     Talks on phone: None     Gets together: None     Attends Restorationism service: None     Active member of club or organization: None     Attends meetings of clubs or organizations: None     Relationship status: None    Intimate partner violence     Fear of current or ex partner: None     Emotionally abused: None     Physically abused: None     Forced sexual activity: None   Other Topics Concern    None   Social History Narrative    None       SCREENINGS      @FLOW([id number])@      PHYSICAL EXAM    (up to 7 for level 4, 8 or more for level 5)     ED Triage Vitals [07/19/20 1931]   BP Temp Temp Source Pulse Resp SpO2 Height Weight   116/75 99 °F (37.2 °C) Oral 94 16 99 % 5' 7\" (1.702 m) 165 lb (74.8 kg)       Physical Exam  Vitals signs and nursing note reviewed. Constitutional:       Appearance: He is well-developed. HENT:      Head: Normocephalic and atraumatic. Right Ear: External ear normal.      Left Ear: External ear normal.   Eyes:      Conjunctiva/sclera: Conjunctivae normal.      Pupils: Pupils are equal, round, and reactive to light. Neck:      Musculoskeletal: Normal range of motion and neck supple. Cardiovascular:      Rate and Rhythm: Normal rate and regular rhythm. Pulmonary:      Effort: Pulmonary effort is normal.      Breath sounds: Normal breath sounds.    Abdominal: General: Bowel sounds are normal. There is no distension. Palpations: Abdomen is soft. Tenderness: There is no abdominal tenderness. Genitourinary:      Musculoskeletal: Normal range of motion. Skin:     General: Skin is warm and dry. Neurological:      Mental Status: He is alert and oriented to person, place, and time. Deep Tendon Reflexes: Reflexes are normal and symmetric. Psychiatric:         Judgment: Judgment normal.           All other labs were within normal range or not returned as of this dictation. EMERGENCY DEPARTMENT COURSE and DIFFERENTIALDIAGNOSIS/MDM:   Vitals:    Vitals:    07/19/20 1931   BP: 116/75   Pulse: 94   Resp: 16   Temp: 99 °F (37.2 °C)   TempSrc: Oral   SpO2: 99%   Weight: 165 lb (74.8 kg)   Height: 5' 7\" (1.702 m)            27 yr old male with exposure to STD. F/U with PCP in 3 days for culture results. Patient verbalizes understanding. PROCEDURES:  Unless otherwise noted below, none     Procedures      FINAL IMPRESSION      1.  Exposure to STD          DISPOSITION/PLAN   DISPOSITION Decision To Discharge 07/19/2020 08:08:32 PM          ZENON Garcia CNP (electronically signed)  Attending Emergency Physician     ZENON Garcia CNP  07/19/20 2016

## 2020-07-21 LAB — URINE CULTURE, ROUTINE: NORMAL

## 2020-07-24 LAB
C. TRACHOMATIS DNA ,URINE: NEGATIVE
N. GONORRHOEAE DNA, URINE: POSITIVE

## 2021-03-23 ENCOUNTER — HOSPITAL ENCOUNTER (INPATIENT)
Age: 31
LOS: 5 days | Discharge: HOME | DRG: 751 | End: 2021-03-29
Attending: PSYCHIATRY & NEUROLOGY | Admitting: PSYCHIATRY & NEUROLOGY
Payer: COMMERCIAL

## 2021-03-23 DIAGNOSIS — F19.10 POLYSUBSTANCE ABUSE (HCC): ICD-10-CM

## 2021-03-23 DIAGNOSIS — F39 MOOD DISORDER (HCC): Primary | ICD-10-CM

## 2021-03-23 LAB
ACETAMINOPHEN LEVEL: <5 UG/ML (ref 10–30)
ALBUMIN SERPL-MCNC: 4.3 G/DL (ref 3.5–4.6)
ALP BLD-CCNC: 115 U/L (ref 35–104)
ALT SERPL-CCNC: 12 U/L (ref 0–41)
AMPHETAMINE SCREEN, URINE: ABNORMAL
ANION GAP SERPL CALCULATED.3IONS-SCNC: 11 MEQ/L (ref 9–15)
AST SERPL-CCNC: 16 U/L (ref 0–40)
BARBITURATE SCREEN URINE: ABNORMAL
BASOPHILS ABSOLUTE: 0.1 K/UL (ref 0–0.2)
BASOPHILS RELATIVE PERCENT: 0.8 %
BENZODIAZEPINE SCREEN, URINE: ABNORMAL
BILIRUB SERPL-MCNC: <0.2 MG/DL (ref 0.2–0.7)
BILIRUBIN URINE: NEGATIVE
BLOOD, URINE: NEGATIVE
BUN BLDV-MCNC: 10 MG/DL (ref 6–20)
CALCIUM SERPL-MCNC: 8.8 MG/DL (ref 8.5–9.9)
CANNABINOID SCREEN URINE: POSITIVE
CHLORIDE BLD-SCNC: 103 MEQ/L (ref 95–107)
CHOLESTEROL, TOTAL: 176 MG/DL (ref 0–199)
CLARITY: CLEAR
CO2: 24 MEQ/L (ref 20–31)
COCAINE METABOLITE SCREEN URINE: POSITIVE
COLOR: YELLOW
CREAT SERPL-MCNC: 1 MG/DL (ref 0.7–1.2)
EOSINOPHILS ABSOLUTE: 0.1 K/UL (ref 0–0.7)
EOSINOPHILS RELATIVE PERCENT: 1.5 %
ETHANOL PERCENT: NORMAL G/DL
ETHANOL: <10 MG/DL (ref 0–0.08)
GFR AFRICAN AMERICAN: >60
GFR NON-AFRICAN AMERICAN: >60
GLOBULIN: 2.6 G/DL (ref 2.3–3.5)
GLUCOSE BLD-MCNC: 103 MG/DL (ref 70–99)
GLUCOSE URINE: NEGATIVE MG/DL
HCT VFR BLD CALC: 44.5 % (ref 42–52)
HDLC SERPL-MCNC: 41 MG/DL (ref 40–59)
HEMOGLOBIN: 15.1 G/DL (ref 14–18)
KETONES, URINE: NEGATIVE MG/DL
LDL CHOLESTEROL CALCULATED: 104 MG/DL (ref 0–129)
LEUKOCYTE ESTERASE, URINE: NEGATIVE
LYMPHOCYTES ABSOLUTE: 2.8 K/UL (ref 1–4.8)
LYMPHOCYTES RELATIVE PERCENT: 38 %
Lab: ABNORMAL
MCH RBC QN AUTO: 31.7 PG (ref 27–31.3)
MCHC RBC AUTO-ENTMCNC: 34 % (ref 33–37)
MCV RBC AUTO: 93.1 FL (ref 80–100)
METHADONE SCREEN, URINE: ABNORMAL
MONOCYTES ABSOLUTE: 0.6 K/UL (ref 0.2–0.8)
MONOCYTES RELATIVE PERCENT: 7.6 %
NEUTROPHILS ABSOLUTE: 3.9 K/UL (ref 1.4–6.5)
NEUTROPHILS RELATIVE PERCENT: 52.1 %
NITRITE, URINE: NEGATIVE
OPIATE SCREEN URINE: ABNORMAL
OXYCODONE URINE: ABNORMAL
PDW BLD-RTO: 14.2 % (ref 11.5–14.5)
PH UA: 6.5 (ref 5–9)
PHENCYCLIDINE SCREEN URINE: ABNORMAL
PLATELET # BLD: 183 K/UL (ref 130–400)
POTASSIUM SERPL-SCNC: 4 MEQ/L (ref 3.4–4.9)
PROPOXYPHENE SCREEN: ABNORMAL
PROTEIN UA: NEGATIVE MG/DL
RBC # BLD: 4.78 M/UL (ref 4.7–6.1)
SALICYLATE, SERUM: <0.3 MG/DL (ref 15–30)
SARS-COV-2, NAAT: NOT DETECTED
SODIUM BLD-SCNC: 138 MEQ/L (ref 135–144)
SPECIFIC GRAVITY UA: 1.02 (ref 1–1.03)
TOTAL CK: 126 U/L (ref 0–190)
TOTAL PROTEIN: 6.9 G/DL (ref 6.3–8)
TRIGL SERPL-MCNC: 157 MG/DL (ref 0–150)
TSH SERPL DL<=0.05 MIU/L-ACNC: 1.57 UIU/ML (ref 0.44–3.86)
URINE REFLEX TO CULTURE: NORMAL
UROBILINOGEN, URINE: 1 E.U./DL
WBC # BLD: 7.5 K/UL (ref 4.8–10.8)

## 2021-03-23 PROCEDURE — 99285 EMERGENCY DEPT VISIT HI MDM: CPT

## 2021-03-23 PROCEDURE — 80053 COMPREHEN METABOLIC PANEL: CPT

## 2021-03-23 PROCEDURE — 87635 SARS-COV-2 COVID-19 AMP PRB: CPT

## 2021-03-23 PROCEDURE — 82077 ASSAY SPEC XCP UR&BREATH IA: CPT

## 2021-03-23 PROCEDURE — 80179 DRUG ASSAY SALICYLATE: CPT

## 2021-03-23 PROCEDURE — 81003 URINALYSIS AUTO W/O SCOPE: CPT

## 2021-03-23 PROCEDURE — 80143 DRUG ASSAY ACETAMINOPHEN: CPT

## 2021-03-23 PROCEDURE — 36415 COLL VENOUS BLD VENIPUNCTURE: CPT

## 2021-03-23 PROCEDURE — 80307 DRUG TEST PRSMV CHEM ANLYZR: CPT

## 2021-03-23 PROCEDURE — 84443 ASSAY THYROID STIM HORMONE: CPT

## 2021-03-23 PROCEDURE — 80061 LIPID PANEL: CPT

## 2021-03-23 PROCEDURE — 82550 ASSAY OF CK (CPK): CPT

## 2021-03-23 PROCEDURE — 85025 COMPLETE CBC W/AUTO DIFF WBC: CPT

## 2021-03-23 ASSESSMENT — ENCOUNTER SYMPTOMS
ABDOMINAL DISTENTION: 0
BACK PAIN: 0
VOMITING: 0
VOICE CHANGE: 0
EYE DISCHARGE: 0
PHOTOPHOBIA: 0
ANAL BLEEDING: 0
NAUSEA: 0
APNEA: 0
COUGH: 1
SHORTNESS OF BREATH: 0

## 2021-03-23 ASSESSMENT — PATIENT HEALTH QUESTIONNAIRE - PHQ9: SUM OF ALL RESPONSES TO PHQ QUESTIONS 1-9: 20

## 2021-03-24 PROBLEM — F39 MOOD DISORDER (HCC): Status: ACTIVE | Noted: 2021-03-24

## 2021-03-24 LAB
EKG ATRIAL RATE: 70 BPM
EKG P AXIS: 60 DEGREES
EKG P-R INTERVAL: 134 MS
EKG Q-T INTERVAL: 384 MS
EKG QRS DURATION: 100 MS
EKG QTC CALCULATION (BAZETT): 414 MS
EKG R AXIS: 86 DEGREES
EKG T AXIS: 30 DEGREES
EKG VENTRICULAR RATE: 70 BPM

## 2021-03-24 PROCEDURE — 93010 ELECTROCARDIOGRAM REPORT: CPT | Performed by: INTERNAL MEDICINE

## 2021-03-24 PROCEDURE — 93005 ELECTROCARDIOGRAM TRACING: CPT | Performed by: PHYSICIAN ASSISTANT

## 2021-03-24 PROCEDURE — 6370000000 HC RX 637 (ALT 250 FOR IP): Performed by: PSYCHIATRY & NEUROLOGY

## 2021-03-24 PROCEDURE — 1240000000 HC EMOTIONAL WELLNESS R&B

## 2021-03-24 PROCEDURE — 99223 1ST HOSP IP/OBS HIGH 75: CPT | Performed by: PSYCHIATRY & NEUROLOGY

## 2021-03-24 RX ORDER — BENZTROPINE MESYLATE 1 MG/ML
2 INJECTION INTRAMUSCULAR; INTRAVENOUS 2 TIMES DAILY PRN
Status: DISCONTINUED | OUTPATIENT
Start: 2021-03-24 | End: 2021-03-29 | Stop reason: HOSPADM

## 2021-03-24 RX ORDER — TRAZODONE HYDROCHLORIDE 50 MG/1
50 TABLET ORAL NIGHTLY PRN
Status: DISCONTINUED | OUTPATIENT
Start: 2021-03-24 | End: 2021-03-29 | Stop reason: HOSPADM

## 2021-03-24 RX ORDER — HYDROXYZINE HYDROCHLORIDE 50 MG/ML
50 INJECTION, SOLUTION INTRAMUSCULAR EVERY 6 HOURS PRN
Status: DISCONTINUED | OUTPATIENT
Start: 2021-03-24 | End: 2021-03-29 | Stop reason: HOSPADM

## 2021-03-24 RX ORDER — HALOPERIDOL 5 MG/ML
5 INJECTION INTRAMUSCULAR EVERY 6 HOURS PRN
Status: DISCONTINUED | OUTPATIENT
Start: 2021-03-24 | End: 2021-03-29 | Stop reason: HOSPADM

## 2021-03-24 RX ORDER — OLANZAPINE 5 MG/1
5 TABLET ORAL NIGHTLY
Status: DISCONTINUED | OUTPATIENT
Start: 2021-03-24 | End: 2021-03-27

## 2021-03-24 RX ORDER — ACETAMINOPHEN 325 MG/1
650 TABLET ORAL EVERY 4 HOURS PRN
Status: DISCONTINUED | OUTPATIENT
Start: 2021-03-24 | End: 2021-03-29 | Stop reason: HOSPADM

## 2021-03-24 RX ORDER — HALOPERIDOL 5 MG
5 TABLET ORAL EVERY 6 HOURS PRN
Status: DISCONTINUED | OUTPATIENT
Start: 2021-03-24 | End: 2021-03-29 | Stop reason: HOSPADM

## 2021-03-24 RX ORDER — NICOTINE 21 MG/24HR
1 PATCH, TRANSDERMAL 24 HOURS TRANSDERMAL DAILY
Status: DISCONTINUED | OUTPATIENT
Start: 2021-03-24 | End: 2021-03-29 | Stop reason: HOSPADM

## 2021-03-24 RX ORDER — HYDROXYZINE PAMOATE 50 MG/1
50 CAPSULE ORAL EVERY 6 HOURS PRN
Status: DISCONTINUED | OUTPATIENT
Start: 2021-03-24 | End: 2021-03-29 | Stop reason: HOSPADM

## 2021-03-24 RX ADMIN — VORTIOXETINE 10 MG: 10 TABLET, FILM COATED ORAL at 09:47

## 2021-03-24 RX ADMIN — TRAZODONE HYDROCHLORIDE 50 MG: 50 TABLET ORAL at 20:56

## 2021-03-24 RX ADMIN — OLANZAPINE 5 MG: 5 TABLET, FILM COATED ORAL at 20:51

## 2021-03-24 ASSESSMENT — SLEEP AND FATIGUE QUESTIONNAIRES
SLEEP PATTERN: INSOMNIA
DIFFICULTY STAYING ASLEEP: YES
DIFFICULTY FALLING ASLEEP: YES
DO YOU USE A SLEEP AID: NO

## 2021-03-24 NOTE — PROGRESS NOTES
Pt. declined to attend the 0900 community meeting, despite staff encouragement. Electronically signed by Keyla Zimmerman, [address] on 3/24/2021 at 9:47 AM

## 2021-03-24 NOTE — SUICIDE SAFETY PLAN
SAFETY PLAN    A suicide Safety Plan is a document that supports someone when they are having thoughts of suicide. Warning Signs that indicate a suicidal crisis may be developing: What (situations, thoughts, feelings, body sensations, behaviors, etc.) do you experience that lets you know you are beginning to think about suicide? 1. Isolation/not talking to family  2. Stopped eating  3. Using alcohol/marijuana    Internal Coping Strategies:  What things can I do (relaxation techniques, hobbies, physical activities, etc.) to take my mind off my problems without contacting another person? 1. Working out   2. Listen to music  3. Cooking     People and social settings that provide distraction: Who can I call or where can I go to distract me? 1. Name: [employer]  Phone: has #  2. Name: Pj Garcia  Phone: has #  3. Place: outside          4. Place: walking    People whom I can ask for help: Who can I call when I need help - for example, friends, family, clergy, someone else? 1. Name: [employer]              Phone: has #  2. Name: Pj Garcia  Phone: has #  3. Name: mom  Phone: has #    Professionals or [employer] agencies I can contact during a crisis: Who can I call for help - for example, my doctor, my psychiatrist, my psychologist, a mental health provider, a suicide hotline? 1. Clinician Name: linked with [address] had first appointment   Phone: [phone]      Clinician Pager or Emergency Contact #: [phone]    2. Clinician Name: Janet Mccray  Phone: has #      Clinician Pager or Emergency Contact #: n/a     3. Suicide Prevention Lifeline: 4-743-889-TALK (3516)    4. [address] [place] [address] Emergency Services -  for example, [facility] suicide hotline, [facility] Hotline: [facility]      Emergency Services Address: [address] [facility]      Emergency Services Phone: [phone]    Making the environment safe:  How can I make my environment (house/apartment/living space) safer? For example, can I remove guns, medications, and other items? 1. Keep taking meds  2.  Attend appointments

## 2021-03-24 NOTE — CONSULTS
[facility]  MEDICINE    HISTORY AND PHYSICAL EXAM    PATIENT NAME:  Nora Peters    MRN:  [id number]  SERVICE DATE:  3/24/2021   SERVICE TIME:  10:32 AM    Primary Care Physician: No primary care provider on file. SUBJECTIVE  CHIEF COMPLAINT:  Medically appropriate for inpatient psychiatry admission. Consult for medical H/P encounter. HPI:  This is a 27 y.o. male with PMHx polysubstance abuse, hyperlipemia and depression who presented to emergency room with suicidal ideations. Patient was pink slipped per [facility] for visual and auditory hallucinations. Patient also endorses fatigue, panic attacks, flashbacks, mood swings, insomnia, nightmares, restlessness and excessive worries. Patient has been a resident of [facility]. Tox screen positive for cocaine and cannabinoid. Patient cleared from emergency room for psychiatric care. Patient Seen, Chart, Labs, Radiologystudies, and Consults reviewed. Patient denies headache, chest pain, shortness of breath, N/V/D/C, fever/chills.        PAST MEDICAL HISTORY:    Past Medical History:   Diagnosis Date    Drug abuse ([facility] [address]     Hyperlipidemia     Psychiatric problem      PAST SURGICAL HISTORY:    Past Surgical History:   Procedure Laterality Date    APPENDECTOMY       FAMILY HISTORY:    Family History   Family history unknown: Yes     SOCIAL HISTORY:    Social History     Socioeconomic History    Marital status: Single     Spouse name: Not on file    Number of children: 0    Years of education: 15    Highest education level: Not on file   Occupational History    Not on file   Social Needs    Financial resource strain: Not on file    Food insecurity     Worry: Not on file     Inability: Not on file   [employer] needs     Medical: Not on file     Non-medical: Not on file   Tobacco Use    Smoking status: Current Every Day Smoker     Packs/day: 0.50    Smokeless tobacco: Never Used   Substance and Sexual Activity  Alcohol use: Yes     Comment: occasionally    Drug use: Not Currently     Types: Marijuana, Opiates     Sexual activity: Yes     Partners: Female   Lifestyle    Physical activity     Days per week: Not on file     Minutes per session: Not on file    Stress: Not on file   Relationships    Social connections     Talks on phone: Not on file     Gets together: Not on file     Attends Adventist service: Not on file     Active member of club or organization: Not on file     Attends meetings of clubs or organizations: Not on file     Relationship status: Not on file    Intimate partner violence     Fear of current or ex partner: Not on file     Emotionally abused: Not on file     Physically abused: Not on file     Forced sexual activity: Not on file   Other Topics Concern    Not on file   Social History Narrative    Not on file     MEDICATIONS:    Current Facility-Administered Medications   Medication Dose Route Frequency Provider Last Rate Last Admin    acetaminophen (TYLENOL) tablet 650 mg  650 mg Oral Q4H PRN Cheryle Clack, MD        traZODone (DESYREL) tablet 50 mg  50 mg Oral Nightly PRN Cheryle Clack, MD        benztropine mesylate (COGENTIN) injection 2 mg  2 mg Intramuscular BID PRN Cheryle Clack, MD        magnesium hydroxide (MILK OF MAGNESIA) 400 MG/5ML suspension 30 mL  30 mL Oral Daily PRN Cheryle Clack, MD        nicotine (NICODERM CQ) 21 MG/24HR 1 patch  1 patch Transdermal Daily Cheryle Clack, MD        haloperidol lactate (HALDOL) injection 5 mg  5 mg Intramuscular Q6H PRN Cheryle Clack, MD        Or    haloperidol (HALDOL) tablet 5 mg  5 mg Oral Q6H PRN Cheryle Clack, MD        hydrOXYzine (VISTARIL) injection 50 mg  50 mg Intramuscular Q6H PRN Cheryle Clack, MD        Or    hydrOXYzine (VISTARIL) capsule 50 mg  50 mg Oral Q6H PRN Cheryle Clack, MD        VORTIoxetine (TRINTELLIX) tablet 10 mg  10 mg Oral Daily Harleen Mcintyre MD   10 mg at 03/24/21 0943  OLANZapine (ZYPREXA) tablet 5 mg  5 mg Oral Nightly Eliazar Lozada MD           ALLERGIES: Patient has no known allergies. REVIEW OF SYSTEM:   ROS as noted in HPI, 12 point ROS reviewed and otherwise negative. OBJECTIVE  PHYSICAL EXAM: BP (!) 94/59 Comment: RN Notified  Pulse 68   Temp 97.4 °F (36.3 °C) (Oral)   Resp 18   Ht 5' 7\" (1.702 m)   Wt 160 lb (72.6 kg)   SpO2 96%   BMI 25.06 kg/m²   CONSTITUTIONAL:  awake, alert, cooperative, no apparent distress, and appears stated age  EYES:  Lids and lashes normal, pupils equal, round and reactive to light, extra ocular muscles intact, sclera clear, conjunctiva normal  ENT:  Normocephalic, without obvious abnormality, atraumatic, sinuses nontender on palpation, external ears without lesions, oral pharynx with moist mucus membranes, tonsils without erythema or exudates, gums normal and good dentition. NECK:  Supple, symmetrical, trachea midline, no adenopathy, thyroid symmetric, not enlarged and no tenderness, skin normal  LUNGS:  No increased work of breathing, good air exchange, clear to auscultation bilaterally, no crackles or wheezing  CARDIOVASCULAR:  Normal apical impulse, regular rate and rhythm, normal S1 and S2, no S3 or S4, and no murmur noted  ABDOMEN:  No scars, normal bowel sounds, soft, non-distended, non-tender, no masses palpated, no hepatosplenomegally  MUSCULOSKELETAL:  There is no redness, warmth, or swelling of the joints. Full range of motion noted. Motor strength is 5 out of 5 all extremities bilaterally. Tone is normal.  NEUROLOGIC:  Awake, alert, oriented to name, place and time. Cranial nerves II-XII are grossly intact. Motor is 5 out of 5 bilaterally. Sensory is intact.  gait is normal.  SKIN:  no bruising or bleeding, normal skin color, texture, turgor, no redness, warmth, or swelling and no jaundice    DATA:     Diagnostic tests reviewed for today's visit:    Most recent labs and imaging results reviewed.      VTE Prophylaxis:     ASSESSMENT AND PLAN  Active Problems:    Mood disorder [facility])  Plan: Patient admitted to behavorial health for evaluation and treatment     Narcotic Addiction with hyperalgesia, anxiety and drug seeking behavior: Chemical Dependency consulted. Pt counseled at length. Discussed MAT and sobriety housing and counseling resources. This is only a history and physical examination and not medical management. The patient is to contact and follow up with their primary care physician and go over any abnormal labs, imaging, findings, medical concerns, or conditions that we have and have not addressed during this encounter.     Plan of care discussed with: patient    SIGNATURE: ZENON Villanueva CNP  DATE: March 24, 2021  TIME: 10:32 AM

## 2021-03-24 NOTE — H&P
Department of Psychiatry  History and Physical - Adult     CHIEF COMPLAINT:  Depression SI    History obtained from:  patient    Patient was seen after discussing with the treatment team and reviewing the chart    HISTORY OF PRESENT ILLNESS:      The patient is a 27 y.o. male single live alone unemployed with significant past history of addiction and depression    ER report:    27year old male present to the ED pinked slipped by Dal[facility], with a recommendation from [facility]ila for a higher level of care for the patient. Per patient pink slip, patient is presenting with SI with plan no intent. Per the patient pink slip patient in concerned that the intent may change due to patient impulsivity. Patient pink slip also reads, patient is experiencing auditory hallucinations of his brother telling him to kill himself for about 3 weeks. Also during the past 3 weeks patient has been experiencing fatigue, panic attacks, flashbacks, mood swings, insomnia, nightmares, restlessness and excessive worries. Per patient pink slip the symptoms that the patient is having is effecting his ADL's, patient is not showering or eating appropriately at this time and this has caused weight loss for the patient. Per patient pink slip patient last use of opioids was December of 2020 and he was staying at the [facility]. Patient here in the [facility] report the same signs and symptoms. Patient reports increased depression, no sleep for 2 days and is unable to state the last time he has slept through the night. Patient has been mildly irritable yet cooperative here on the unit. Patient report that his appetite is good sometimes and sometimes not so good. Patient denies HI and visual hallucinations. Patient has expressed SI with multiple plans such as shooting himself or buying bad drugs and overdosing on them. Patient does report that he does not have access to a gun.       During interview:    Pt has been feeling Raised: augusto  Describes Childhood:   supportive  Education: [employer]  Employment: Unemployed, not seeking work  Relationships: single      EXAMINATION:    REVIEW OF SYSTEMS:    ROS:  [x] All negative/unchanged except if checked.  Explain positive(checked items) below:  [] Constitutional  [] Eyes  [] Ear/Nose/Mouth/Throat  [] Respiratory  [] CV  [] GI  []   [] Musculoskeletal  [] Skin/Breast  [] Neurological  [] Endocrine  [] Heme/Lymph  [] Allergic/Immunologic    Explanation:     Vitals:  BP (!) 94/59 Comment: RN Notified  Pulse 68   Temp 97.4 °F (36.3 °C) (Oral)   Resp 18   Ht 5' 7\" (1.702 m)   Wt 160 lb (72.6 kg)   SpO2 96%   BMI 25.06 kg/m²      Neurologic Exam:   Muscle Strength & Tone: full ROM  Gait: normal gait   Involuntary Movements: No    Mental Status Examination:    Level of consciousness:  within normal limits   Appearance:  ill-appearing  Behavior/Motor:  psychomotor retardation  Attitude toward examiner:  cooperative  Speech:  slow   Mood: decreased range, depressed and dysthymic  Affect:  blunted  Thought processes:  coherent   Thought content:  Suicidal Ideation:  active  Delusions:  no evidence of delusions  Perceptual Disturbance:  AH ++  Cognition:  oriented to person, place, and time   Concentration poor  Memory intact  Insight poor   Judgement poor   Fund of Knowledge adequate    Mini Mental Status 30/30      DIAGNOSIS:     MDD recurrent severe with psychosis           RISK ASSESSMENT:    SUICIDE RISK ASSESSMENT:high  HOMICIDE: low  AGITATION/VIOLENCE: low  ELOPEMENT: high    LABS: REVIEWED TODAY:  Recent Labs     03/23/21  2258   WBC 7.5   HGB 15.1        Recent Labs     03/23/21  2258      K 4.0      CO2 24   BUN 10   CREATININE 1.00   GLUCOSE 103*     Recent Labs     03/23/21  2258   BILITOT <0.2   ALKPHOS 115*   AST 16   ALT 12     Lab Results   Component Value Date    LABAMPH Neg 03/23/2021    BARBSCNU Neg 03/23/2021    LABBENZ Neg 03/23/2021    LABMETH Neg 03/23/2021    OPIATESCREENURINE Neg 03/23/2021    PHENCYCLIDINESCREENURINE Neg 03/23/2021    ETOH <10 03/23/2021     Lab Results   Component Value Date    TSH 1.570 03/23/2021     No results found for: LITHIUM  No results found for: VALPROATE, CBMZ  No results found for: LITHIUM, VALPROATE    FURTHER LABS ORDERED :      Radiology   No results found. EKG: TRACING REVIEWED    TREATMENT PLAN:    Risk Management:  close watch and suicide risk    Collateral Information:  Will obtain collateral information from the family or friends. Will obtain medical records as appropriate from out patient providers  Will consult the hospitalist for a physical exam to rule out any co-morbid physical condition. Home medication Reconciled       New Medications started during this admission :    See orders  Prn Haldol 5mg and Vistaril 50mg q6hr for extreme agitation. Trazodone as ordered for insomnia  Vistaril as ordered for anxiety  Discussed with the patient risk, benefit, alternative and common side effects for the  proposed medication treatment. Patient is consenting to the treatment.     Psychotherapy:   Encourage participation in milieu and group therapy  Individual therapy as needed      Electronically signed by Rosa Flower MD on 3/24/2021 at 9:30 AM
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
## 2021-03-24 NOTE — ED PROVIDER NOTES
[address] [facility] ED  eMERGENCY dEPARTMENT eNCOUnter      Pt Name: Jay Fiore  MRN: [id number]  Ocgfroxy 1990  Date of evaluation: 3/23/2021  Provider: Daphne Arreola PA-C    CHIEF COMPLAINT       Chief Complaint   Patient presents with    Suicidal         HISTORY OF PRESENT ILLNESS   (Location/Symptom, Timing/Onset,Context/Setting, Quality, Duration, Modifying Factors, Severity)  Note limiting factors. Jay Fiore is a 27 y.o. male who presents to the emergency department patient presents suicidal from [facility] prior pink slip with audiovisual hallucinations. Patient denies any injuries denies cuts bruises denies fever chills nausea vomiting he does have slight cough notes he does smoke. Patient denies any over-the-counter medications overdose. Symptoms moderate severity nothing improves or worsens her symptoms. HPI    NursingNotes were reviewed. REVIEW OF SYSTEMS    (2-9 systems for level 4, 10 or more for level 5)     Review of Systems   Constitutional: Negative for activity change, appetite change, fever and unexpected weight change. HENT: Negative for ear discharge, nosebleeds and voice change. Eyes: Negative for photophobia and discharge. Respiratory: Positive for cough. Negative for apnea and shortness of breath. Cardiovascular: Negative for chest pain. Gastrointestinal: Negative for abdominal distention, anal bleeding, nausea and vomiting. Endocrine: Negative for cold intolerance, heat intolerance and polyphagia. Genitourinary: Negative for dysuria, genital sores and hematuria. Musculoskeletal: Negative for back pain, joint swelling, neck pain and neck stiffness. Skin: Negative for pallor. Allergic/Immunologic: Negative for immunocompromised state. Neurological: Negative for seizures and facial asymmetry. Hematological: Does not bruise/bleed easily. Psychiatric/Behavioral: Positive for hallucinations and suicidal ideas.  Negative for behavioral problems, self-injury and sleep disturbance. All other systems reviewed and are negative. Except as noted above the remainder of the review of systems was reviewed and negative. PAST MEDICAL HISTORY     Past Medical History:   Diagnosis Date    Drug abuse ([facility] [address]     Hyperlipidemia     Psychiatric problem          SURGICALHISTORY       Past Surgical History:   Procedure Laterality Date    APPENDECTOMY           CURRENT MEDICATIONS       Previous Medications    FAMOTIDINE (PEPCID) 40 MG TABLET    Take 1 tablet by mouth daily    ONDANSETRON (ZOFRAN) 4 MG TABLET    Take 1 tablet by mouth every 8 hours as needed for Nausea       ALLERGIES     Patient has no known allergies.     FAMILY HISTORY       Family History   Family history unknown: Yes          SOCIAL HISTORY       Social History     Socioeconomic History    Marital status: Single     Spouse name: None    Number of children: 0    Years of education: 15    Highest education level: None   Occupational History    None   Social Needs    Financial resource strain: None    Food insecurity     Worry: None     Inability: None    Transportation needs     Medical: None     Non-medical: None   Tobacco Use    Smoking status: Current Every Day Smoker     Packs/day: 0.50    Smokeless tobacco: Never Used   Substance and Sexual Activity    Alcohol use: Yes     Comment: occasionally    Drug use: Not Currently     Types: Marijuana, Opiates     Sexual activity: Yes     Partners: Female   Lifestyle    Physical activity     Days per week: None     Minutes per session: None    Stress: None   Relationships    Social connections     Talks on phone: None     Gets together: None     Attends Orthodoxy service: None     Active member of club or organization: None     Attends meetings of clubs or organizations: None     Relationship status: None    Intimate partner violence     Fear of current or ex partner: None     Emotionally abused: None     Physically abused: None Forced sexual activity: None   Other Topics Concern    None   Social History Narrative    None       SCREENINGS      @FLOW([id number])@      PHYSICAL EXAM    (up to 7 for level 4, 8 or more for level 5)     ED Triage Vitals [03/23/21 2236]   BP Temp Temp Source Pulse Resp SpO2 Height Weight   119/78 98.1 °F (36.7 °C) Oral 81 20 97 % 5' 7\" (1.702 m) 160 lb (72.6 kg)       Physical Exam  Vitals signs and nursing note reviewed. Constitutional:       General: He is not in acute distress. Appearance: He is well-developed. HENT:      Head: Normocephalic and atraumatic. Right Ear: External ear normal.      Left Ear: External ear normal.      Nose: Nose normal.      Mouth/Throat:      Mouth: Mucous membranes are moist.   Eyes:      General:         Right eye: No discharge. Left eye: No discharge. Pupils: Pupils are equal, round, and reactive to light. Neck:      Musculoskeletal: Normal range of motion and neck supple. Cardiovascular:      Rate and Rhythm: Normal rate and regular rhythm. Pulses: Normal pulses. Heart sounds: Normal heart sounds. Pulmonary:      Effort: Pulmonary effort is normal. No respiratory distress. Breath sounds: Normal breath sounds. No stridor. Abdominal:      General: Bowel sounds are normal. There is no distension. Palpations: Abdomen is soft. Musculoskeletal: Normal range of motion. Skin:     General: Skin is warm. Findings: No erythema. Neurological:      Mental Status: He is alert and oriented to person, place, and time. Psychiatric:         Mood and Affect: Mood normal.         DIAGNOSTIC RESULTS     EKG: All EKG's are interpreted by the Emergency Department Physician who either signs or Co-signsthis chart in the absence of a cardiologist.    EKG normal sinus rhythm rate 70 \neg ST segment elevation. HI interval 134 ms  ms QT 3 to 4 ms PRT axes positive.     RADIOLOGY:   Non-plain filmimages such as CT, Ultrasound and MRI are read by the radiologist. Plain radiographic images are visualized and preliminarily interpreted by the emergency physician with the below findings:         Interpretation per the Radiologist below, if available at the time ofthis note:    No orders to display         ED BEDSIDE ULTRASOUND:   Performed by ED Physician - none    LABS:  Labs Reviewed   ACETAMINOPHEN LEVEL - Abnormal; Notable for the following components:       Result Value    Acetaminophen Level <5 (*)     All other components within normal limits   CBC WITH AUTO DIFFERENTIAL - Abnormal; Notable for the following components:    MCH 31.7 (*)     All other components within normal limits   COMPREHENSIVE METABOLIC PANEL - Abnormal; Notable for the following components:    Glucose 103 (*)     Alkaline Phosphatase 115 (*)     All other components within normal limits   LIPID PANEL - Abnormal; Notable for the following components:    Triglycerides 157 (*)     All other components within normal limits   SALICYLATE LEVEL - Abnormal; Notable for the following components:    Salicylate, Serum <7.1 (*)     All other components within normal limits   URINE DRUG SCREEN - Abnormal; Notable for the following components:    Cannabinoid Scrn, Ur POSITIVE (*)     Cocaine Metabolite Screen, Urine POSITIVE (*)     All other components within normal limits   COVID-19, RAPID   CK   ETHANOL   TSH WITHOUT REFLEX   URINE RT REFLEX TO CULTURE       All other labs were within normal range or not returned as of this dictation.     EMERGENCY DEPARTMENT COURSE and DIFFERENTIAL DIAGNOSIS/MDM:   Vitals:    Vitals:    03/23/21 2236   BP: 119/78   Pulse: 81   Resp: 20   Temp: 98.1 °F (36.7 °C)   TempSrc: Oral   SpO2: 97%   Weight: 160 lb (72.6 kg)   Height: 5' 7\" (1.702 m)            MDM  Number of Diagnoses or Management Options  Polysubstance abuse ([facility] [address]  Diagnosis management comments: Medically stable       Amount and/or Complexity of Data Reviewed  Clinical lab tests: reviewed

## 2021-03-24 NOTE — ED NOTES
Patient present to the ED with auditory hallucinations of his brother telling him to harm himself. Patient also has SI with multiple plans to shoot himself with a gun and to buy some bad drugs and overdose. Patient denies visual hallucinations and HI. Patient reports no sleep for the last 2 days.   Patient is unable to report the last time he had a full nights rest.     Katie De Los Santos RN  03/23/21 4951 [facility], [address] [place] [address]  03/23/21 4703

## 2021-03-24 NOTE — PROGRESS NOTES
Pt. refused to attend the 1000 skills group, despite staff encouragement. Electronically signed by Librado Booth, [address] on 3/24/2021 at 11:46 AM

## 2021-03-24 NOTE — PROGRESS NOTES
Behavioral Services  Medicare Certification Upon Admission    I certify that this patient's inpatient psychiatric hospital admission is medically necessary for:    [x] (1) Treatment which could reasonably be expected to improve this patient's condition,       [x] (2) Or for diagnostic study;     AND     [x](2) The inpatient psychiatric services are provided while the individual is under the care of a physician and are included in the individualized plan of care.     Estimated length of stay/service 3-5 days    Plan for post-hospital care - Establish OP care    Electronically signed by Ted Mera MD on 3/24/2021 at 9:39 AM

## 2021-03-24 NOTE — CARE COORDINATION
Brief Intervention and Referral to Treatment Summary    Patient was provided PHQ-9, AUDIT and DAST Screening:      PHQ-9 Score: 20  AUDIT Score:  0  DAST Score:  1    Patients substance use is considered     Low Risk/Healthy X  Moderate Risk  Harmful  Dependent    Patients depression is considered:     Minimal  Mild   Moderate  Moderately  Severe X   Severe    Brief Education Was Provided    Patient was receptive  Patient was not receptive X       Brief Intervention Is Provided (Only for AUDIT or DAST)     Patient reports readiness to decrease and/or stop use and a plan was discussed   Patient denies readiness to decrease and/or stop use and a plan was not discussed X       Recommendations/Referrals for Brief and/or Specialized Treatment Provided to Patient       Pt reports being sober for 3 months besides smoking marijuana and drinking when his symptoms increased. Pt states he went to inpatient detox that his PO organized.  Pt reports that he is linked with [facility], completed his initial intake but has not met his ongoing therapist. Electronically signed by LEEANNA Dillard on 3/24/2021 at 3:16 PM

## 2021-03-24 NOTE — PROGRESS NOTES
Patient was laying in bed in his room. He was awake and alert. He had a flat affect, was worrisome and was soft spoken. He was pleasant and cooperative. Patient stated he is here for his mental health. He was not eating well and had poor sleep. He has had a weight loss of 10-15 lbs over the last 3 weeks. He had auditory hallucinations, hearing his brother's voice telling him to kill himself. He denies any visual hallucinations. He felt suicidal with multiple plans such as shooting himself or buying bad drugs and overdosing on them. He denies drinking alcohol or using drugs except smoking marijuana every so often. Patient is on probation and has to go to court for charges dealing with drugs. He lives alone. His family are supportive. He enjoys listening to music.  Electronically signed by Merle Moncada [address] on 3/24/2021 at 8:59 AM

## 2021-03-24 NOTE — ED NOTES
Spoke with Dr. Kiley Whelan for patient disposition. Patient is to be admitted to [address] [place] [address] with standard PRN's.      Ashley Cash, [address] [facility]  03/24/21 6041

## 2021-03-24 NOTE — PROGRESS NOTES
Patient did not attend group despite staff encouragement.   Electronically signed by Mayco Peers on 3/24/2021 at 5:37 PM

## 2021-03-24 NOTE — ED NOTES
Report called to [facility] on [address] patient room assignment is 374.      Ginette Quinones, [facility]  03/24/21 0000

## 2021-03-24 NOTE — FLOWSHEET NOTE
Patient still admits to having fleeting thoughts of suicide, he denies HI and VH, admits to [facility], says he hears his brother call his name and tell him to kill self at times. Patient rates his depression a 10 and anxiety a 5. He reports sleep has been poor but was good since being here and says his appetite fluctuates.

## 2021-03-24 NOTE — ED TRIAGE NOTES
Patient presents via [address] [place] [address],[address] with a pink slip from [facility] for suicidal ideations. Patient calm and cooperative in triage. No distress noted on arrival. Pink slip states patient has been experiencing auditory hallucinations telling him to kill himself.

## 2021-03-24 NOTE — PROGRESS NOTES
Patient arrived to unit via wheelchair accompanied by staff. Pt ambulated to assigned room with steady gait. Skin assessment and contraband check complete by this nurse and Sushant Winkler. Skin intact, no contraband found.  Electronically signed by Becky Longoria RN on 3/24/21 at 4:19 AM EDT

## 2021-03-24 NOTE — PROGRESS NOTES
Cooperative with admission assessment/ unit consents. Pt soft spoken, avoids eye contact, and is tearful at times. Pt evasive with responses to assessment questions. Pt reports admission d/t SI and hearing his brother calling his name/ nickname. Pt verbally contracts for safety on unit. Pt states AH x 10+ years. Pt does not appear internally stimulated or psychotic. Pt encouraged to come to hospital per mother and sister. Pt feels the need to be here and would like treatment for mental health. Pt reports having an appt with [facility] on Friday to see psychiatrist for meds. Pt admits to occasional THC use but denies using cocaine (tox +). Pt reports mood as anxious and sad (for many years) with an increase over the last 3 weeks. Pt cannot identify specific triggers or recent events/ stress. Pt states he was \"feeling high and ambitious\" but now is feeling \"low and sad. \" Pt states he lives alone, family is support system, and works \"under the table\". Pt reports loss of 10-15 lbs over the last 3 weeks d/t poor appetite. C/o poor sleep- unable to specify a number of hours. Pt admits to 1 past suicide attempt by OD and ETOH when younger. Denies current SI, HI. Pt verbally contracts for safety on unit. Denies hx violence.

## 2021-03-24 NOTE — PROGRESS NOTES
Nutrition Assessment     Type and Reason for Visit: Initial, Positive Nutrition Screen(poor po/wt loss)    Nutrition Recommendations/Plan: Continue General diet     Nutrition Assessment:  Pt admitted to inpatient behavioral health unit for Mood disorder. Reported poor appetite with wt loss pta. Anticipate appetite/po intake to improvve with inpatient behavioral health treatment, will continue to monitor    Current Nutrition Therapies:    DIET GENERAL;     Anthropometric Measures:  · Height: 5' 7\" (170.2 cm)  · Current Body Wt: 160 lb (72.6 kg)   · BMI: 25.1    Nutrition Diagnosis:   · Inadequate oral intake related to psychological cause or life stress as evidenced by poor intake prior to admission    Nutrition Interventions:   Food and/or Nutrient Delivery:  Continue Current Diet(Continue General diet)  Nutrition Education/Counseling:  No recommendation at this time   Coordination of Nutrition Care:  Continue to monitor while inpatient    Goals:  po intake > 75% of meals, need actual wt       Nutrition Monitoring and Evaluation:      Food/Nutrient Intake Outcomes:  Food and Nutrient Intake  Physical Signs/Symptoms Outcomes:  Weight     Electronically signed by Siria Almonte RD, LD on 3/24/21 at 10:57 AM EDT

## 2021-03-25 PROBLEM — F33.3 MDD (MAJOR DEPRESSIVE DISORDER), RECURRENT, SEVERE, WITH PSYCHOSIS (HCC): Status: ACTIVE | Noted: 2021-03-24

## 2021-03-25 PROCEDURE — 6370000000 HC RX 637 (ALT 250 FOR IP): Performed by: PSYCHIATRY & NEUROLOGY

## 2021-03-25 PROCEDURE — 99232 SBSQ HOSP IP/OBS MODERATE 35: CPT | Performed by: PSYCHIATRY & NEUROLOGY

## 2021-03-25 PROCEDURE — 1240000000 HC EMOTIONAL WELLNESS R&B

## 2021-03-25 RX ADMIN — VORTIOXETINE 10 MG: 10 TABLET, FILM COATED ORAL at 09:05

## 2021-03-25 RX ADMIN — TRAZODONE HYDROCHLORIDE 50 MG: 50 TABLET ORAL at 20:11

## 2021-03-25 RX ADMIN — HYDROXYZINE PAMOATE 50 MG: 50 CAPSULE ORAL at 17:15

## 2021-03-25 RX ADMIN — OLANZAPINE 5 MG: 5 TABLET, FILM COATED ORAL at 20:05

## 2021-03-25 NOTE — PROGRESS NOTES
Patient did not attend group despite staff encouragement.   Electronically signed by Henry Padilla on 3/25/2021 at 5:06 PM

## 2021-03-25 NOTE — FLOWSHEET NOTE
Pt has isolated to room all afternoon. Lying in bed with sheets over his head. Complaining of a headache but declines need for medications. Says AH are less intrusive but are still pleasant. Denies SI/HI/VH. Admits to mild anxiety and depression. Reports eating and sleeping well. Avoids gaze during interview. Says he showered earlier in the day. Not attending groups. Evasive and blunt.

## 2021-03-25 NOTE — PROGRESS NOTES
Pt is noted returning to his room after lunch, reported before lunch depression and depression #7, reports voices that are calling his name, reports poor sleep, denies any suicidal thoughts.

## 2021-03-25 NOTE — PROGRESS NOTES
Pt. refused to attend the 1000 skills group, despite staff encouragement. Electronically signed by Librado Booth, [address] on 3/25/2021 at 1:53 PM

## 2021-03-25 NOTE — PROGRESS NOTES
Pt. declined to attend the 0900 community meeting, despite staff encouragement. Electronically signed by Severiano Adie, [address] on 3/25/2021 at 1:36 PM

## 2021-03-25 NOTE — PROGRESS NOTES
Pt approached this RN asking for something for his anxiety rated at a 9/10. When asked what he was so anxious about pt states \"I don't know I just feel it\". Pt medicated with Vistaril 50 mg. Pt is currently sitting in dayroom eating dinner with peers.      Electronically signed by Ana Malone RN on 3/25/2021 at 5:17 PM

## 2021-03-25 NOTE — PROGRESS NOTES
[facility]. FOLLOW-UP NOTE       3/25/2021     Patient was seen and examined in person, Chart reviewed   Patient's case discussed with staff/team    Chief Complaint: Depression SI, AH    Interim History:     Pt report feeling depressed  Hopeless and worthless feeling  Fleeting SI ++  AH- calling his names, non commanding  Appetite:   [] Normal/Unchanged  [] Increased  [x] Decreased      Sleep:       [] Normal/Unchanged  [x] Fair       [] Poor              Energy:    [] Normal/Unchanged  [] Increased  [x] Decreased        SI [x] Present  [] Absent    HI  []Present  [] Absent     Aggression:  [] yes  [] no    Patient is [] able  [x] unable to CONTRACT FOR SAFETY     PAST MEDICAL/PSYCHIATRIC HISTORY:   Past Medical History:   Diagnosis Date    Drug abuse ([address]     Hyperlipidemia     Psychiatric problem        FAMILY/SOCIAL HISTORY:  Family History   Family history unknown: Yes     Social History     Socioeconomic History    Marital status: Single     Spouse name: Not on file    Number of children: 0    Years of education: 12    Highest education level: Not on file   Occupational History    Not on file   Social Needs    Financial resource strain: Not on file    Food insecurity     Worry: Not on file     Inability: Not on file   [facility] needs     Medical: Not on file     Non-medical: Not on file   Tobacco Use    Smoking status: Current Every Day Smoker     Packs/day: 0.50    Smokeless tobacco: Never Used   Substance and Sexual Activity    Alcohol use: Yes     Comment: occasionally    Drug use: Not Currently     Types: Marijuana, Opiates     Sexual activity: Yes     Partners: Female   Lifestyle    Physical activity     Days per week: Not on file     Minutes per session: Not on file    Stress: Not on file   Relationships    Social connections     Talks on phone: Not on file     Gets together: Not on file     Attends Rastafari service: Not on file     Active member of club or organization: Not on file     Attends meetings of clubs or organizations: Not on file     Relationship status: Not on file    Intimate partner violence     Fear of current or ex partner: Not on file     Emotionally abused: Not on file     Physically abused: Not on file     Forced sexual activity: Not on file   Other Topics Concern    Not on file   Social History Narrative    Not on file           ROS:  [x] All negative/unchanged except if checked.  Explain positive(checked items) below:  [] Constitutional  [] Eyes  [] Ear/Nose/Mouth/Throat  [] Respiratory  [] CV  [] GI  []   [] Musculoskeletal  [] Skin/Breast  [] Neurological  [] Endocrine  [] Heme/Lymph  [] Allergic/Immunologic    Explanation:     MEDICATIONS:    Current Facility-Administered Medications:     acetaminophen (TYLENOL) tablet 650 mg, 650 mg, Oral, Q4H PRN, Genesis Scott MD    traZODone (DESYREL) tablet 50 mg, 50 mg, Oral, Nightly PRN, Genesis Scott MD, 50 mg at 03/24/21 2056    benztropine mesylate (COGENTIN) injection 2 mg, 2 mg, Intramuscular, BID PRN, Genesis Scott MD    magnesium hydroxide (MILK OF MAGNESIA) 400 MG/5ML suspension 30 mL, 30 mL, Oral, Daily PRN, Genesis Scott MD    nicotine (NICODERM CQ) 21 MG/24HR 1 patch, 1 patch, Transdermal, Daily, Genesis Scott MD    haloperidol lactate (HALDOL) injection 5 mg, 5 mg, Intramuscular, Q6H PRN **OR** haloperidol (HALDOL) tablet 5 mg, 5 mg, Oral, Q6H PRN, Genesis Scott MD    hydrOXYzine (VISTARIL) injection 50 mg, 50 mg, Intramuscular, Q6H PRN **OR** hydrOXYzine (VISTARIL) capsule 50 mg, 50 mg, Oral, Q6H PRN, Genesis Scott MD    VORTIoxetine (TRINTELLIX) tablet 10 mg, 10 mg, Oral, Daily, Lula Petit MD, 10 mg at 03/25/21 0905    OLANZapine (ZYPREXA) tablet 5 mg, 5 mg, Oral, Nightly, Lula Petit MD, 5 mg at 03/24/21 2051      Examination:  /71   Pulse 56 Comment: RN notified  Temp 98 °F (36.7 °C) (Oral)   Resp 18   Ht 5' 7\" (1.702 m)   Wt 155 lb 3.2 oz (70.4 kg)   SpO2 98%   BMI 24.31 kg/m²   Gait - steady  Medication side effects(SE): no    Mental Status Examination:    Level of consciousness:  within normal limits   Appearance:  fair grooming and fair hygiene  Behavior/Motor:  psychomotor retardation  Attitude toward examiner:  cooperative  Speech:  slow   Mood: depressed  Affect:  blunted  Thought processes:  slow   Thought content:  Perceptual Disturbance:  auditory  Cognition:  oriented to person, place, and time   Concentration poor  Insight poor   Judgement poor     ASSESSMENT:   Patient symptoms are:  [] Well controlled  [] Improving  [] Worsening  [] No change      Diagnosis:   Principal Problem:    MDD (major depressive disorder), recurrent, severe, with [facility]  Resolved Problems:    * No resolved hospital problems. *      LABS:    Recent Labs     03/23/21 2258   WBC 7.5   HGB 15.1        Recent Labs     03/23/21 2258      K 4.0      CO2 24   BUN 10   CREATININE 1.00   GLUCOSE 103*     Recent Labs     03/23/21 2258   BILITOT <0.2   ALKPHOS 115*   AST 16   ALT 12     Lab Results   Component Value Date    LABAMPH Neg 03/23/2021    BARBSCNU Neg 03/23/2021    LABBENZ Neg 03/23/2021    LABMETH Neg 03/23/2021    OPIATESCREENURINE Neg 03/23/2021    PHENCYCLIDINESCREENURINE Neg 03/23/2021    ETOH <10 03/23/2021     Lab Results   Component Value Date    TSH 1.570 03/23/2021     No results found for: LITHIUM  No results found for: VALPROATE, CBMZ      Treatment Plan:  Reviewed current Medications with the patient. Medication as ordered  Risks, benefits, side effects, drug-to-drug interactions and alternatives to treatment were discussed. Collateral information:   CD evaluation  Encourage patient to attend group and other milieu activities.   Discharge planning discussed with the patient and treatment team.    PSYCHOTHERAPY/COUNSELING:  [x] Therapeutic interview  [x] Supportive  [] CBT  [] Ongoing [] Other    [x] Patient continues to need, on a daily basis, active treatment furnished directly by or requiring the supervision of inpatient psychiatric personnel      Anticipated Length of stay:            Electronically signed by Ted Mera MD on 3/25/2021 at 3:41 PM

## 2021-03-25 NOTE — PROGRESS NOTES
Patient did not attend group despite staff encouragement.   Electronically signed by Carmen Massey on 3/24/2021 at 9:57 PM

## 2021-03-25 NOTE — PROGRESS NOTES
Patient did not attend group despite staff encouragement.   Electronically signed by Darryn Montero on 3/24/2021 at 9:57 PM

## 2021-03-25 NOTE — PROGRESS NOTES
Patient did not attend group despite staff encouragement.   Electronically signed by Cat Koehler on 3/25/2021 at 2:05 PM

## 2021-03-25 NOTE — PROGRESS NOTES
Patient did not attend group despite staff encouragement.   Electronically signed by Vinie Prader on 3/25/2021 at 1:03 PM

## 2021-03-26 PROCEDURE — 1240000000 HC EMOTIONAL WELLNESS R&B

## 2021-03-26 PROCEDURE — 6370000000 HC RX 637 (ALT 250 FOR IP): Performed by: PSYCHIATRY & NEUROLOGY

## 2021-03-26 PROCEDURE — 99232 SBSQ HOSP IP/OBS MODERATE 35: CPT | Performed by: PSYCHIATRY & NEUROLOGY

## 2021-03-26 PROCEDURE — 90833 PSYTX W PT W E/M 30 MIN: CPT | Performed by: PSYCHIATRY & NEUROLOGY

## 2021-03-26 RX ADMIN — HYDROXYZINE PAMOATE 50 MG: 50 CAPSULE ORAL at 17:33

## 2021-03-26 RX ADMIN — TRAZODONE HYDROCHLORIDE 50 MG: 50 TABLET ORAL at 20:34

## 2021-03-26 RX ADMIN — VORTIOXETINE 10 MG: 10 TABLET, FILM COATED ORAL at 08:37

## 2021-03-26 RX ADMIN — OLANZAPINE 5 MG: 5 TABLET, FILM COATED ORAL at 20:34

## 2021-03-26 NOTE — PROGRESS NOTES
Patient did not attend group despite staff encouragement.   Electronically signed by Art Fields on 3/25/2021 at 9:32 PM

## 2021-03-26 NOTE — PROGRESS NOTES
Patient did not attend group despite staff encouragement.   Electronically signed by Ben Manrique on 3/25/2021 at 9:50 PM

## 2021-03-26 NOTE — PROGRESS NOTES
Chriss Martines \[facility]\"" 89. FOLLOW-UP NOTE       3/26/2021     Patient was seen and examined in person, Chart reviewed   Patient's case discussed with staff/team    Chief Complaint: Depression SI, AH    Interim History:   No change in presentation  Worried about his court appearance today  Pt report feeling depressed  Hopeless and worthless feeling  Fleeting SI ++  AH- calling his names, non commanding  Appetite:   [] Normal/Unchanged  [] Increased  [x] Decreased      Sleep:       [] Normal/Unchanged  [x] Fair       [] Poor              Energy:    [] Normal/Unchanged  [] Increased  [x] Decreased        SI [x] Present  [] Absent    HI  []Present  [] Absent     Aggression:  [] yes  [] no    Patient is [] able  [x] unable to CONTRACT FOR SAFETY     PAST MEDICAL/PSYCHIATRIC HISTORY:   Past Medical History:   Diagnosis Date    [facility]     Hyperlipidemia     Psychiatric problem        FAMILY/SOCIAL HISTORY:  Family History   Family history unknown: Yes     Social History     Socioeconomic History    Marital status: Single     Spouse name: Not on file    Number of children: 0    Years of education: 12    Highest education level: Not on file   Occupational History    Not on file   Social Needs    Financial resource strain: Not on file    Food insecurity     Worry: Not on file     Inability: Not on file   [employer] needs     Medical: Not on file     Non-medical: Not on file   Tobacco Use    Smoking status: Current Every Day Smoker     Packs/day: 0.50    Smokeless tobacco: Never Used   Substance and Sexual Activity    Alcohol use: Yes     Comment: occasionally    Drug use: Not Currently     Types: Marijuana, Opiates     Sexual activity: Yes     Partners: Female   Lifestyle    Physical activity     Days per week: Not on file     Minutes per session: Not on file    Stress: Not on file   Relationships    Social connections     Talks on phone: Not on file     Gets together: 2005      Examination:  /61   Pulse 64   Temp 98.5 °F (36.9 °C) (Oral)   Resp 18   Ht 5' 7\" (1.702 m)   Wt 155 lb 3.2 oz (70.4 kg)   SpO2 99%   BMI 24.31 kg/m²   Gait - steady  Medication side effects(SE): no    Mental Status Examination:    Level of consciousness:  within normal limits   Appearance:  fair grooming and fair hygiene  Behavior/Motor:  psychomotor retardation  Attitude toward examiner:  cooperative  Speech:  slow   Mood: depressed  Affect:  blunted  Thought processes:  slow   Thought content:  Perceptual Disturbance:  auditory  Cognition:  oriented to person, place, and time   Concentration poor  Insight poor   Judgement poor     ASSESSMENT:   Patient symptoms are:  [] Well controlled  [] Improving  [] Worsening  [] No change      Diagnosis:   Principal Problem:    MDD (major depressive disorder), recurrent, severe, with psychosis ([facility]  Resolved Problems:    * No resolved hospital problems. *      LABS:    Recent Labs     03/23/21  2258   WBC 7.5   HGB 15.1        Recent Labs     03/23/21  2258      K 4.0      CO2 24   BUN 10   CREATININE 1.00   GLUCOSE 103*     Recent Labs     03/23/21  2258   BILITOT <0.2   ALKPHOS 115*   AST 16   ALT 12     Lab Results   Component Value Date    LABAMPH Neg 03/23/2021    BARBSCNU Neg 03/23/2021    LABBENZ Neg 03/23/2021    LABMETH Neg 03/23/2021    OPIATESCREENURINE Neg 03/23/2021    PHENCYCLIDINESCREENURINE Neg 03/23/2021    ETOH <10 03/23/2021     Lab Results   Component Value Date    TSH 1.570 03/23/2021     No results found for: LITHIUM  No results found for: VALPROATE, CBMZ      Treatment Plan:  Reviewed current Medications with the patient. Medication as ordered  Risks, benefits, side effects, drug-to-drug interactions and alternatives to treatment were discussed. Collateral information:   CD evaluation  Encourage patient to attend group and other milieu activities.   Discharge planning discussed with the patient and treatment team.    PSYCHOTHERAPY/COUNSELING:  [x] Therapeutic interview  [x] Supportive  [] CBT  [] Ongoing  [] Other  Patient was seen 1:1 for 20 minutes, other than E&M time spent, focusing on      - coping skills techniques     - Anxiety management techniques discussed including deep breathing exercise and PMR     - discussing patients strength and weakness      - Motivational interviewing to assess the stage of change and assessing patient readiness to quit substance use.      - Focusing on negative cognition and maladaptive thoughts, which is feeding and maintaining the depression symptoms        [x] Patient continues to need, on a daily basis, active treatment furnished directly by or requiring the supervision of inpatient psychiatric personnel      Anticipated Length of stay:            Electronically signed by Francis Kaye MD on 3/26/2021 at 5:00 PM

## 2021-03-26 NOTE — PROGRESS NOTES
Pt. refused to attend the 1000 skills group, despite staff encouragement.  Electronically signed by Halle Schmid on 3/26/2021 at 12:04 PM

## 2021-03-27 PROCEDURE — 6370000000 HC RX 637 (ALT 250 FOR IP): Performed by: PSYCHIATRY & NEUROLOGY

## 2021-03-27 PROCEDURE — 1240000000 HC EMOTIONAL WELLNESS R&B

## 2021-03-27 RX ORDER — OLANZAPINE 10 MG/1
10 TABLET ORAL NIGHTLY
Status: DISCONTINUED | OUTPATIENT
Start: 2021-03-27 | End: 2021-03-29 | Stop reason: HOSPADM

## 2021-03-27 RX ADMIN — TRAZODONE HYDROCHLORIDE 50 MG: 50 TABLET ORAL at 20:48

## 2021-03-27 RX ADMIN — OLANZAPINE 10 MG: 10 TABLET, FILM COATED ORAL at 20:48

## 2021-03-27 RX ADMIN — VORTIOXETINE 10 MG: 10 TABLET, FILM COATED ORAL at 09:40

## 2021-03-27 NOTE — PROGRESS NOTES
Pt. declined to attend the 0900 community meeting, despite staff encouragement.  Electronically signed by Yaquelin Tejeda on 3/27/2021 at 9:22 AM

## 2021-03-27 NOTE — BH NOTE
Patient retreats back to his room to rest. He reports that he is sleeping better since being in the hospital. Denies visual disturbances but reports hearing the voice of his brother. He does not want to talk about what he hears, only that it is very disturbing.  He denies being suicidal or homicidal.

## 2021-03-27 NOTE — PROGRESS NOTES
Patient did not attend group despite staff encouragement.   Electronically signed by Shannan Stone on 3/27/2021 at 5:56 PM

## 2021-03-27 NOTE — PROGRESS NOTES
Pt. refused to attend the 1000 skills group, despite staff encouragement.  Electronically signed by Rosa Engel on 3/27/2021 at 12:26 PM

## 2021-03-27 NOTE — CARE COORDINATION
FAMILY COLLATERAL NOTE    May need to consider additional collateral due to not cooperating with questions. Family/Support Name: Karissa Santiago #: [phone]  Relationship to Pt[de-identified] sister see each other \"enough\"      Family/Support contact aware of hospitalization:yes  Presenting Symptoms/Current Concerns:    \"I was with him 2 days prior and he was his regular self. \"   Sister could not provide baseline behavior. Unaware of what led up to hospitalization. Top 3 Life Stressors:   Financial   Legal   homeless \"he stays with anyone\" street, brother, mother, park      Background History Relevant to Current Hospitalization:  prior chemical dependency (very vague)  Completed rehab 60 days inpatient- [facility]  refused to priovide court case       Family Mental Health/Substance Use History:   [id number] to share diagnosis; mental illness      Support Network's Goal for Hospitalization:     Discharge Plan: Can stay with Sister at release. Lives local.       Support Network Supportive of Discharge Plan: yes  sees mother and brother- relationship \"pretty good\"     Support can confirm Safety of Location and Security of Weapons: Does not live with sister, however Ganesh Patient states       Support agreeable to Safeguard and Monitor Medications (including Prescription and OTC): yes, when he is staying with her    Identified Barriers to Compliance with Discharge Plan:     Recommendations for Support Network: [employer] with any questions.          Zeinab Vigil RN

## 2021-03-27 NOTE — ACP (ADVANCE CARE PLANNING)
Patient did not attend psychotherapy Assertive Rights, despite staff encouragement.   Electronically signed by Kira Murphy RN on 3/27/2021 at 12:37 PM

## 2021-03-28 PROCEDURE — 6370000000 HC RX 637 (ALT 250 FOR IP): Performed by: PSYCHIATRY & NEUROLOGY

## 2021-03-28 PROCEDURE — 1240000000 HC EMOTIONAL WELLNESS R&B

## 2021-03-28 RX ADMIN — VORTIOXETINE 10 MG: 10 TABLET, FILM COATED ORAL at 08:35

## 2021-03-28 RX ADMIN — TRAZODONE HYDROCHLORIDE 50 MG: 50 TABLET ORAL at 20:20

## 2021-03-28 RX ADMIN — OLANZAPINE 10 MG: 10 TABLET, FILM COATED ORAL at 20:20

## 2021-03-28 RX ADMIN — VORTIOXETINE 5 MG: 10 TABLET, FILM COATED ORAL at 16:24
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
## 2021-03-28 NOTE — BH NOTE
Yvrose Platt has been calm and cooperative. He does hear the voice of his brother. No SI, HI, or visual hallucinations. He is social with select peers. Affect is reactive. He admits the voices are less intrusive. He spends time talking with his X-fiance. He agrees to take medication after discharge and realizes it keeps him well.

## 2021-03-28 NOTE — PROGRESS NOTES
38.0 03/23/2021    MONOPCT 7.6 03/23/2021    BASOPCT 0.8 03/23/2021    MONOSABS 0.6 03/23/2021    LYMPHSABS 2.8 03/23/2021    EOSABS 0.1 03/23/2021    BASOSABS 0.1 03/23/2021     CMP:    Lab Results   Component Value Date     03/23/2021    K 4.0 03/23/2021     03/23/2021    CO2 24 03/23/2021    BUN 10 03/23/2021    CREATININE 1.00 03/23/2021    GFRAA >60.0 03/23/2021    LABGLOM >60.0 03/23/2021    GLUCOSE 103 03/23/2021    PROT 6.9 03/23/2021    LABALBU 4.3 03/23/2021    CALCIUM 8.8 03/23/2021    BILITOT <0.2 03/23/2021    ALKPHOS 115 03/23/2021    AST 16 03/23/2021    ALT 12 03/23/2021       Medications  Current Facility-Administered Medications: OLANZapine (ZYPREXA) tablet 10 mg, 10 mg, Oral, Nightly  acetaminophen (TYLENOL) tablet 650 mg, 650 mg, Oral, Q4H PRN  traZODone (DESYREL) tablet 50 mg, 50 mg, Oral, Nightly PRN  benztropine mesylate (COGENTIN) injection 2 mg, 2 mg, Intramuscular, BID PRN  magnesium hydroxide (MILK OF MAGNESIA) 400 MG/5ML suspension 30 mL, 30 mL, Oral, Daily PRN  nicotine (NICODERM CQ) 21 MG/24HR 1 patch, 1 patch, Transdermal, Daily  haloperidol lactate (HALDOL) injection 5 mg, 5 mg, Intramuscular, Q6H PRN **OR** haloperidol (HALDOL) tablet 5 mg, 5 mg, Oral, Q6H PRN  hydrOXYzine (VISTARIL) injection 50 mg, 50 mg, Intramuscular, Q6H PRN **OR** hydrOXYzine (VISTARIL) capsule 50 mg, 50 mg, Oral, Q6H PRN  VORTIoxetine (TRINTELLIX) tablet 10 mg, 10 mg, Oral, Daily    ASSESSMENT AND PLAN  RISK ASSESSMENT:  Significant traumatic component to his depressive disorder along with social anxiety still puts him at  A high risk of suicide   SUICIDE RISK ASSESSMENT: high  HOMICIDE: low  AGITATION/VIOLENCE: low  ELOPEMENT: low     DIAGNOSIS:  MDD w/psychotic features  PTSD  PLAN: increase trintellix  Cont olanzapine 10 mg at bedtime  . Hailey Soares MD Psychiatry

## 2021-03-28 NOTE — GROUP NOTE
Group Therapy Note    Date: 3/27/2021    Group Start Time: 2100  Group End Time: 2140  Group Topic: Wrap-Up    [facility]    Donal Godoy        Group Therapy Note    Attendees: 12/15       Patient's Goal:  To write down goals to accomplish upon d/c. Notes:  Pt reports meeting their goal for today. Pt chose not to participate in a guided body scan meditation. Status After Intervention:  Improved    Participation Level:  Active Listener and Interactive    Participation Quality: Appropriate, Attentive and Sharing      Speech:  normal      Thought Process/Content: Logical      Affective Functioning: Congruent      Mood: euthymic      Level of consciousness:  Alert and Attentive      Response to Learning: Able to verbalize current knowledge/experience, Able to verbalize/acknowledge new learning, Able to change behavior and Progressing to goal      Endings: None Reported    Modes of Intervention: Support      Discipline Responsible: [address] [facility] [employer]      Signature:  Donal Godoy

## 2021-03-28 NOTE — PROGRESS NOTES
Patient did not attend group despite staff encouragement.   Electronically signed by Dudley Arambula on 3/27/2021 at 9:50 PM

## 2021-03-28 NOTE — PROGRESS NOTES
Pt. declined to attend the 0900 community meeting, despite staff encouragement.  Electronically signed by Ruslan Han on 3/28/2021 at 9:22 AM

## 2021-03-28 NOTE — PROGRESS NOTES
Pt. refused to attend the 1000 skills group, despite staff encouragement.  Electronically signed by Shawna Cee on 3/28/2021 at 11:20 AM

## 2021-03-28 NOTE — PROGRESS NOTES
Patient did not attend group despite staff encouragement.   Electronically signed by Britt Mathur on 3/28/2021 at 7:41 PM

## 2021-03-29 VITALS
HEART RATE: 56 BPM | SYSTOLIC BLOOD PRESSURE: 123 MMHG | RESPIRATION RATE: 16 BRPM | OXYGEN SATURATION: 98 % | HEIGHT: 67 IN | DIASTOLIC BLOOD PRESSURE: 73 MMHG | TEMPERATURE: 98.3 F | WEIGHT: 155.2 LBS | BODY MASS INDEX: 24.36 KG/M2

## 2021-03-29 PROCEDURE — 99239 HOSP IP/OBS DSCHRG MGMT >30: CPT | Performed by: PSYCHIATRY & NEUROLOGY

## 2021-03-29 PROCEDURE — 6370000000 HC RX 637 (ALT 250 FOR IP): Performed by: PSYCHIATRY & NEUROLOGY

## 2021-03-29 RX ORDER — TRAZODONE HYDROCHLORIDE 50 MG/1
50 TABLET ORAL NIGHTLY PRN
Qty: 15 TABLET | Refills: 2 | Status: SHIPPED | OUTPATIENT
Start: 2021-03-29

## 2021-03-29 RX ORDER — OLANZAPINE 10 MG/1
10 TABLET ORAL NIGHTLY
Qty: 15 TABLET | Refills: 3 | Status: SHIPPED | OUTPATIENT
Start: 2021-03-29

## 2021-03-29 RX ADMIN — VORTIOXETINE 15 MG: 10 TABLET, FILM COATED ORAL at 08:45

## 2021-03-29 NOTE — CARE COORDINATION
Approached patient regarding a different person for collateral. Made patient aware that his sister did not answer all of the questions and asked if he had someone else SW could call. Patient stated the only other person is his mom and he did not want her to worry so he does not want SW to all her.    Electronically signed by Jose Martin Terrazas on 3/29/2021 at 10:00 AM

## 2021-03-29 NOTE — DISCHARGE SUMMARY
DISCHARGE SUMMARY      Patient ID:  Rolando Church  [id number]  70 y.o.  1990      Admit date: 3/23/2021    Discharge date and time: 3/29/2021    Admitting Physician: Claudette Lambert MD     Discharge Physician: Dr Kenia Ochoa MD    Admission Diagnoses: Mood disorder [facility]) [F39]    Admission Condition: poor    Discharged Condition: stable    Admission Circumstance: The patient is a 27 y.o. male single live alone unemployed with significant past history of addiction and depression     ER report:     27year old male present to the ED pinked slipped by Raul Mancia, with a recommendation from Raul Mancia for a higher level of care for the patient.  Per patient pink slip, patient is presenting with SI with plan no intent.  Per the patient pink slip patient in concerned that the intent may change due to patient impulsivity.  Patient pink slip also reads, patient is experiencing auditory hallucinations of his brother telling him to kill himself for about 3 weeks.  Also during the past 3 weeks patient has been experiencing fatigue, panic attacks, flashbacks, mood swings, insomnia, nightmares, restlessness and excessive worries.  Per patient pink slip the symptoms that the patient is having is effecting his ADL's, patient is not showering or eating appropriately at this time and this has caused weight loss for the patient.  Per patient pink slip patient last use of opioids was December of 2020 and he was staying at the [email or web address] for 4 weeks.   Patient here in the [facility] report the same signs and symptoms.  Patient reports increased depression, no sleep for 2 days and is unable to state the last time he has slept through the night.  Patient has been mildly irritable yet cooperative here on the unit.  Patient report that his appetite is good sometimes and sometimes not so good.  Patient denies HI and visual hallucinations.  Patient has expressed SI with multiple plans such as shooting himself or buying bad drugs and overdosing on them. Ganga Goyal does report that he does not have access to a gun.       During interview:     Pt has been feeling depressed and hallucinating  Hear brother voices asking to harm self  Has been anxious which is getting worse  Depression Duration: 3 weeks  Severity: Rating mood to be around 2/10 (10- good)  Quality:melancholic  Worse all day  Content: Hopeless, worthless and helpless feeling  Suicidal thoughts - want to shoot self or take overdose.  No access to guns or weapons  Associated symptoms:  Poor concentration, anhedonia, decrease motivation  Sleep and appetite- poor  Weight loss ++  Has been hallucinating with his brothers voice calling his name for past 10 years but when he gets more depressed the voices gets negative- asking to harm self     The patient is not currently receiving care for the above psychiatric illness.     Medications Prior to Admission:     Prescriptions Prior to Admission   Medications Prior to Admission: ondansetron (ZOFRAN) 4 MG tablet, Take 1 tablet by mouth every 8 hours as needed for Nausea  famotidine (PEPCID) 40 MG tablet, Take 1 tablet by mouth daily        Compliance:n/a     Psychiatric Review of Systems       Depression: yes     Pat or Hypomania:  no     Panic Attacks:  yes      Phobias:  no     Obsessions and Compulsions:  no     PTSD : no     Hallucinations:  no     Delusions:  no     Substance Abuse History:  ETOH: used alcohol 2 weeks ago x 1  Marijuana: yes  Opiates: sober since Dec last year  Other Drugs: no        Past Psychiatric History:  Prior Diagnosis:  Major depressive disorder; addiction  Psychiatrist: no- made appointment at [address] [place] [address]  Therapist:no  Hospitalization: no  Hx of Suicidal Attempts: no  Hx of violence:  no  ECT: no  Previous discontinued Psychiatric Med Trials: no        PAST MEDICAL/PSYCHIATRIC HISTORY:   Past Medical History:   Diagnosis Date    Drug abuse ([facility] [address]     Hyperlipidemia     Psychiatric problem        FAMILY/SOCIAL HISTORY:  Family History   Family history unknown: Yes     Social History     Socioeconomic History    Marital status: Single     Spouse name: Not on file    Number of children: 0    Years of education: 15    Highest education level: Not on file   Occupational History    Not on file   Social Needs    Financial resource strain: Not on file    Food insecurity     Worry: Not on file     Inability: Not on file   [employer] needs     Medical: Not on file     Non-medical: Not on file   Tobacco Use    Smoking status: Current Every Day Smoker     Packs/day: 0.50    Smokeless tobacco: Never Used   Substance and Sexual Activity    Alcohol use: Yes     Comment: occasionally    Drug use: Not Currently     Types: Marijuana, Opiates     Sexual activity: Yes     Partners: Female   Lifestyle    Physical activity     Days per week: Not on file     Minutes per session: Not on file    Stress: Not on file   Relationships    Social connections     Talks on phone: Not on file     Gets together: Not on file     Attends Worship service: Not on file     Active member of club or organization: Not on file     Attends meetings of clubs or organizations: Not on file     Relationship status: Not on file    Intimate partner violence     Fear of current or ex partner: Not on file     Emotionally abused: Not on file     Physically abused: Not on file     Forced sexual activity: Not on file   Other Topics Concern    Not on file   Social History Narrative    Not on file       MEDICATIONS:    Current Facility-Administered Medications:     VORTIoxetine (TRINTELLIX) tablet 15 mg, 15 mg, Oral, Daily, Robi Gutierrez MD, 15 mg at 03/29/21 0845    OLANZapine (ZYPREXA) tablet 10 mg, 10 mg, Oral, Nightly, Robi Gutierrez MD, 10 mg at 03/28/21 2020    acetaminophen (TYLENOL) tablet 650 mg, 650 mg, Oral, Q4H PRN, Neville Barajas MD    traZODone (DESYREL) tablet 50 mg, 50 mg, Oral, Nightly PRN, Neville Barajsa noted  Attitude toward examiner:  attentive and good eye contact  Speech:  spontaneous, normal rate and normal volume   Mood: euthymic  Affect:  mood congruent  Thought processes:  goal directed   Thought content:  Suicidal Ideation:  denies suicidal ideation  Delusions:  no evidence of delusions  Perceptual Disturbance:  denies any perceptual disturbance  Cognition:  oriented to person, place, and time   Concentration intact  Memory intact  Insight good   Judgement fair   Fund of Knowledge adequate      ASSESSMENT:  Patient symptoms are:  [x] Well controlled  [x] Improving  [] Worsening  [] No change      Diagnosis:  Principal Problem:    MDD (major depressive disorder), recurrent, severe, with psychosis ([facility] [address]  Resolved Problems:    * No resolved hospital problems. *      LABS:    No results for input(s): WBC, HGB, PLT in the last 72 hours. No results for input(s): NA, K, CL, CO2, BUN, CREATININE, GLUCOSE in the last 72 hours. No results for input(s): BILITOT, ALKPHOS, AST, ALT in the last 72 hours. Lab Results   Component Value Date    LABAMPH Neg 03/23/2021    BARBSCNU Neg 03/23/2021    LABBENZ Neg 03/23/2021    LABMETH Neg 03/23/2021    OPIATESCREENURINE Neg 03/23/2021    PHENCYCLIDINESCREENURINE Neg 03/23/2021    ETOH <10 03/23/2021     Lab Results   Component Value Date    TSH 1.570 03/23/2021     No results found for: LITHIUM  No results found for: VALPROATE, CBMZ    RISK ASSESSMENT AT DISCHARGE: Low risk for suicide and homicide. Treatment Plan:  Reviewed current Medications with the patient. Education provided on the complaince with treatment. Risks, benefits, side effects, drug-to-drug interactions and alternatives to treatment were discussed. Encourage patient to attend outpatient follow up appointment and therapy. Patient was advised to call the outpatient provider, visit the nearest ED or call 911 if symptoms are not manageable.      Patient's family member was contacted prior to the discharge.          Medication List      START taking these medications    OLANZapine 10 MG tablet  Commonly known as: ZYPREXA  Take 1 tablet by mouth nightly     traZODone 50 MG tablet  Commonly known as: DESYREL  Take 1 tablet by mouth nightly as needed for Sleep     VORTIoxetine 5 MG tablet  Commonly known as: TRINTELLIX  Take 3 tablets by mouth daily  Start taking on: March 30, 2021        Tony Perez taking these medications    famotidine 40 MG tablet  Commonly known as: PEPCID  Take 1 tablet by mouth daily        STOP taking these medications    ondansetron 4 MG tablet  Commonly known as: Zofran           Where to Get Your Medications      These medications were sent to [address] [place] [address], [address]  [address], [place] [address]    Phone: [phone]   · OLANZapine 10 MG tablet  · traZODone 50 MG tablet  · VORTIoxetine 5 MG tablet           Reason for more than one antipsychotic:   [x] N/A  [] 3 failed monotherapy(drugs tried):  [] Cross over to a new antipsychotic  [] Taper to monotherapy from polypharmacy  [] Augmentation of Clozapine therapy due to treatment resistance to single therapy        TIME SPEND - 35 MINUTES TO COMPLETE THE EVALUATION, DISCHARGE SUMMARY, MEDICATION RECONCILIATION AND FOLLOW UP CARE     SignedBandar Osuna  3/29/2021  9:52 AM

## 2021-03-29 NOTE — PROGRESS NOTES
Patient did not attend group despite staff encouragement.   Electronically signed by Anayeli Crow on 3/28/2021 at 10:22 PM

## 2021-03-29 NOTE — PROGRESS NOTES
Patient did not attend group despite staff encouragement.   Electronically signed by John Galaviz on 3/28/2021 at 8:13 PM

## 2021-03-29 NOTE — PROGRESS NOTES
Pt. attended the 0900 community meeting. Electronically signed by Denny Parker [facility] on 3/29/2021 at 9:34 AM

## 2023-07-17 ENCOUNTER — HOSPITAL ENCOUNTER (EMERGENCY)
Age: 33
Discharge: HOME | End: 2023-07-17
Payer: COMMERCIAL

## 2023-07-17 VITALS
WEIGHT: 155 LBS | RESPIRATION RATE: 18 BRPM | HEIGHT: 67 IN | OXYGEN SATURATION: 100 % | SYSTOLIC BLOOD PRESSURE: 118 MMHG | DIASTOLIC BLOOD PRESSURE: 84 MMHG | TEMPERATURE: 98.4 F | BODY MASS INDEX: 24.33 KG/M2 | HEART RATE: 87 BPM

## 2023-07-17 DIAGNOSIS — F19.90 SUBSTANCE USE DISORDER: Primary | ICD-10-CM

## 2023-07-17 LAB
ALBUMIN SERPL-MCNC: 4.8 G/DL (ref 3.5–4.6)
ALP SERPL-CCNC: 109 U/L (ref 35–104)
ALT SERPL-CCNC: 26 U/L (ref 0–41)
AMPHET UR QL SCN: ABNORMAL
ANION GAP SERPL CALCULATED.3IONS-SCNC: 10 MEQ/L (ref 9–15)
AST SERPL-CCNC: 19 U/L (ref 0–40)
BARBITURATES UR QL SCN: ABNORMAL
BASOPHILS # BLD: 0 K/UL (ref 0–0.2)
BASOPHILS NFR BLD: 0.5 %
BENZODIAZ UR QL SCN: ABNORMAL
BILIRUB SERPL-MCNC: 0.7 MG/DL (ref 0.2–0.7)
BILIRUB UR QL STRIP: NEGATIVE
BUN SERPL-MCNC: 14 MG/DL (ref 6–20)
CALCIUM SERPL-MCNC: 9.6 MG/DL (ref 8.5–9.9)
CANNABINOIDS UR QL SCN: POSITIVE
CHLORIDE SERPL-SCNC: 102 MEQ/L (ref 95–107)
CLARITY UR: CLEAR
CO2 SERPL-SCNC: 27 MEQ/L (ref 20–31)
COCAINE UR QL SCN: ABNORMAL
COLOR UR: YELLOW
CREAT SERPL-MCNC: 1.11 MG/DL (ref 0.7–1.2)
DRUG SCREEN COMMENT UR-IMP: ABNORMAL
EOSINOPHIL # BLD: 0.1 K/UL (ref 0–0.7)
EOSINOPHIL NFR BLD: 1.3 %
ERYTHROCYTE [DISTWIDTH] IN BLOOD BY AUTOMATED COUNT: 13.7 % (ref 11.5–14.5)
FENTANYL SCREEN, URINE: ABNORMAL
GLOBULIN SER CALC-MCNC: 2.4 G/DL (ref 2.3–3.5)
GLUCOSE SERPL-MCNC: 96 MG/DL (ref 70–99)
GLUCOSE UR STRIP-MCNC: NEGATIVE MG/DL
HCT VFR BLD AUTO: 44.3 % (ref 42–52)
HGB BLD-MCNC: 15 G/DL (ref 14–18)
HGB UR QL STRIP: NEGATIVE
KETONES UR STRIP-MCNC: ABNORMAL MG/DL
LACTATE BLDV-SCNC: 1.1 MMOL/L (ref 0.5–2.2)
LEUKOCYTE ESTERASE UR QL STRIP: NEGATIVE
LYMPHOCYTES # BLD: 2.5 K/UL (ref 1–4.8)
LYMPHOCYTES NFR BLD: 31.8 %
MCH RBC QN AUTO: 30.1 PG (ref 27–31.3)
MCHC RBC AUTO-ENTMCNC: 34 % (ref 33–37)
MCV RBC AUTO: 88.6 FL (ref 79–92.2)
METHADONE UR QL SCN: ABNORMAL
MONOCYTES # BLD: 0.7 K/UL (ref 0.2–0.8)
MONOCYTES NFR BLD: 9.3 %
NEUTROPHILS # BLD: 4.5 K/UL (ref 1.4–6.5)
NEUTS SEG NFR BLD: 57.1 %
NITRITE UR QL STRIP: NEGATIVE
OPIATES UR QL SCN: ABNORMAL
OXYCODONE UR QL SCN: ABNORMAL
PCP UR QL SCN: ABNORMAL
PH UR STRIP: 5.5 [PH] (ref 5–9)
PLATELET # BLD AUTO: 208 K/UL (ref 130–400)
POTASSIUM SERPL-SCNC: 4 MEQ/L (ref 3.4–4.9)
PROPOXYPH UR QL SCN: ABNORMAL
PROT SERPL-MCNC: 7.2 G/DL (ref 6.3–8)
PROT UR STRIP-MCNC: NEGATIVE MG/DL
RBC # BLD AUTO: 5 M/UL (ref 4.7–6.1)
SODIUM SERPL-SCNC: 139 MEQ/L (ref 135–144)
SP GR UR STRIP: 1.03 (ref 1–1.03)
URINE REFLEX TO CULTURE: ABNORMAL
UROBILINOGEN UR STRIP-ACNC: 0.2 E.U./DL
WBC # BLD AUTO: 8 K/UL (ref 4.8–10.8)

## 2023-07-17 PROCEDURE — 6360000002 HC RX W HCPCS

## 2023-07-17 PROCEDURE — 81003 URINALYSIS AUTO W/O SCOPE: CPT

## 2023-07-17 PROCEDURE — 96374 THER/PROPH/DIAG INJ IV PUSH: CPT

## 2023-07-17 PROCEDURE — 80053 COMPREHEN METABOLIC PANEL: CPT

## 2023-07-17 PROCEDURE — 83605 ASSAY OF LACTIC ACID: CPT

## 2023-07-17 PROCEDURE — 80307 DRUG TEST PRSMV CHEM ANLYZR: CPT

## 2023-07-17 PROCEDURE — 99284 EMERGENCY DEPT VISIT MOD MDM: CPT

## 2023-07-17 PROCEDURE — 85025 COMPLETE CBC W/AUTO DIFF WBC: CPT

## 2023-07-17 PROCEDURE — 2580000003 HC RX 258

## 2023-07-17 PROCEDURE — 96361 HYDRATE IV INFUSION ADD-ON: CPT

## 2023-07-17 PROCEDURE — 36415 COLL VENOUS BLD VENIPUNCTURE: CPT

## 2023-07-17 RX ORDER — KETOROLAC TROMETHAMINE 30 MG/ML
30 INJECTION, SOLUTION INTRAMUSCULAR; INTRAVENOUS ONCE
Status: COMPLETED | OUTPATIENT
Start: 2023-07-17 | End: 2023-07-17

## 2023-07-17 RX ORDER — 0.9 % SODIUM CHLORIDE 0.9 %
1000 INTRAVENOUS SOLUTION INTRAVENOUS ONCE
Status: COMPLETED | OUTPATIENT
Start: 2023-07-17 | End: 2023-07-17

## 2023-07-17 RX ADMIN — SODIUM CHLORIDE 1000 ML: 9 INJECTION, SOLUTION INTRAVENOUS at 05:32

## 2023-07-17 RX ADMIN — KETOROLAC TROMETHAMINE 30 MG: 30 INJECTION, SOLUTION INTRAMUSCULAR; INTRAVENOUS at 05:33

## 2023-07-17 ASSESSMENT — LIFESTYLE VARIABLES: HOW OFTEN DO YOU HAVE A DRINK CONTAINING ALCOHOL: NEVER

## 2023-07-17 ASSESSMENT — ENCOUNTER SYMPTOMS
EYE PAIN: 0
DIARRHEA: 0
ALLERGIC/IMMUNOLOGIC NEGATIVE: 1
EYE ITCHING: 0
NAUSEA: 1
SHORTNESS OF BREATH: 0
ABDOMINAL PAIN: 0
COUGH: 0
VOMITING: 1
EYE DISCHARGE: 0
CONSTIPATION: 0

## 2023-07-17 ASSESSMENT — PAIN - FUNCTIONAL ASSESSMENT: PAIN_FUNCTIONAL_ASSESSMENT: NONE - DENIES PAIN

## 2023-07-17 NOTE — ED NOTES
Discharge  instructions given and reviewed. Patient verbalized understanding. Patient ambulated out of ED with a steady gait to waiting area to await ride.       Navid Mcgill RN  07/17/23 5009

## 2023-07-17 NOTE — ED TRIAGE NOTES
Pt c/o fatigue and weakness. Pt states his legs keep giving out as he is on day 7 of detox from a pain medication. Pt is a/o x 4 calm, skin p/w/d resp even and non-labored. Pt was placed in w/c at front door when his legs were giving out as he walked in. Pt did not fall just c/o being extremely weak.

## 2023-07-17 NOTE — ED PROVIDER NOTES
[facility] ED  EMERGENCY DEPARTMENT ENCOUNTER      Pt Name: Erica Spears  MRN: [id number]  [address] [facility] 1990  Date of evaluation: 7/17/2023  Provider: Elias Goyal PA-C    CHIEF COMPLAINT       Chief Complaint   Patient presents with    Fatigue     Pt states he is detoxing, feels dehydrated and weak. HISTORY OF PRESENT ILLNESS   (Location/Symptom, Timing/Onset, Context/Setting, Quality, Duration, Modifying Factors, Severity)  Note limiting factors. Erica Spears is a 35 y.o. male whom per chart review has a PMHx of hyperlipidemia, MDD, substance-induced mood disorder presents to ED for evaluation of generalized fatigue. Patient reports that he is currently on day 7 of detoxing from kratom. Patient reports that [facility] and he reports that upon discharge, he noted that he felt fatigued, with weakness of BLE, generalized body aches, n/v.  Patient reports that he was not discharged home with any supportive medications, but states that he has been taking OTC IBU with minimal relief of symptoms. Patient denies chest pain, SOB, diarrhea, fever, chills, hematuria, dysuria, urinary frequency/urgency. HPI    Nursing Notes were reviewed. REVIEW OF SYSTEMS    (2-9 systems for level 4, 10 or more for level 5)     Review of Systems   Constitutional:  Positive for fatigue. Negative for activity change, appetite change, chills and fever. HENT:  Negative for congestion. Eyes:  Negative for pain, discharge and itching. Respiratory:  Negative for cough and shortness of breath. Cardiovascular:  Negative for chest pain, palpitations and leg swelling. Gastrointestinal:  Positive for nausea and vomiting. Negative for abdominal pain, constipation and diarrhea. Endocrine: Negative for polydipsia, polyphagia and polyuria. Genitourinary:  Negative for difficulty urinating and dysuria.    Musculoskeletal:  Positive

## 2023-08-21 ENCOUNTER — HOSPITAL ENCOUNTER (INPATIENT)
Age: 33
LOS: 5 days | Discharge: HOME | End: 2023-08-26
Attending: PSYCHIATRY & NEUROLOGY | Admitting: PSYCHIATRY & NEUROLOGY
Payer: MEDICAID

## 2023-08-21 DIAGNOSIS — F31.9 BIPOLAR 1 DISORDER (HCC): Primary | ICD-10-CM

## 2023-08-21 DIAGNOSIS — F14.10 COCAINE ABUSE (HCC): ICD-10-CM

## 2023-08-21 LAB
ALBUMIN SERPL-MCNC: 4.4 G/DL (ref 3.5–4.6)
ALP SERPL-CCNC: 90 U/L (ref 35–104)
ALT SERPL-CCNC: 17 U/L (ref 0–41)
AMPHET UR QL SCN: ABNORMAL
ANION GAP SERPL CALCULATED.3IONS-SCNC: 9 MEQ/L (ref 9–15)
APAP SERPL-MCNC: <5 UG/ML (ref 10–30)
AST SERPL-CCNC: 14 U/L (ref 0–40)
BARBITURATES UR QL SCN: ABNORMAL
BASOPHILS # BLD: 0.1 K/UL (ref 0–0.2)
BASOPHILS NFR BLD: 0.8 %
BENZODIAZ UR QL SCN: ABNORMAL
BILIRUB SERPL-MCNC: 0.5 MG/DL (ref 0.2–0.7)
BILIRUB UR QL STRIP: NEGATIVE
BUN SERPL-MCNC: 13 MG/DL (ref 6–20)
CALCIUM SERPL-MCNC: 9.1 MG/DL (ref 8.5–9.9)
CANNABINOIDS UR QL SCN: POSITIVE
CHLORIDE SERPL-SCNC: 105 MEQ/L (ref 95–107)
CHOLEST SERPL-MCNC: 195 MG/DL (ref 0–199)
CK SERPL-CCNC: 60 U/L (ref 0–190)
CLARITY UR: ABNORMAL
CO2 SERPL-SCNC: 27 MEQ/L (ref 20–31)
COCAINE UR QL SCN: POSITIVE
COLOR UR: YELLOW
CREAT SERPL-MCNC: 1.05 MG/DL (ref 0.7–1.2)
DRUG SCREEN COMMENT UR-IMP: ABNORMAL
EOSINOPHIL # BLD: 0.1 K/UL (ref 0–0.7)
EOSINOPHIL NFR BLD: 0.8 %
ERYTHROCYTE [DISTWIDTH] IN BLOOD BY AUTOMATED COUNT: 15.9 % (ref 11.5–14.5)
ETHANOL PERCENT: NORMAL G/DL
ETHANOLAMINE SERPL-MCNC: <10 MG/DL (ref 0–0.08)
FENTANYL SCREEN, URINE: ABNORMAL
GLOBULIN SER CALC-MCNC: 2.2 G/DL (ref 2.3–3.5)
GLUCOSE SERPL-MCNC: 78 MG/DL (ref 70–99)
GLUCOSE UR STRIP-MCNC: NEGATIVE MG/DL
HCT VFR BLD AUTO: 44 % (ref 42–52)
HDLC SERPL-MCNC: 62 MG/DL (ref 40–59)
HGB BLD-MCNC: 14.8 G/DL (ref 14–18)
HGB UR QL STRIP: NEGATIVE
KETONES UR STRIP-MCNC: ABNORMAL MG/DL
LDLC SERPL CALC-MCNC: 97 MG/DL (ref 0–129)
LEUKOCYTE ESTERASE UR QL STRIP: NEGATIVE
LYMPHOCYTES # BLD: 1.9 K/UL (ref 1–4.8)
LYMPHOCYTES NFR BLD: 27.5 %
MCH RBC QN AUTO: 31 PG (ref 27–31.3)
MCHC RBC AUTO-ENTMCNC: 33.7 % (ref 33–37)
MCV RBC AUTO: 92 FL (ref 79–92.2)
METHADONE UR QL SCN: ABNORMAL
MONOCYTES # BLD: 0.6 K/UL (ref 0.2–0.8)
MONOCYTES NFR BLD: 9.4 %
NEUTROPHILS # BLD: 4.2 K/UL (ref 1.4–6.5)
NEUTS SEG NFR BLD: 61.5 %
NITRITE UR QL STRIP: NEGATIVE
OPIATES UR QL SCN: ABNORMAL
OXYCODONE UR QL SCN: ABNORMAL
PCP UR QL SCN: ABNORMAL
PH UR STRIP: 7.5 [PH] (ref 5–9)
PLATELET # BLD AUTO: 227 K/UL (ref 130–400)
POTASSIUM SERPL-SCNC: 3.9 MEQ/L (ref 3.4–4.9)
PROPOXYPH UR QL SCN: ABNORMAL
PROT SERPL-MCNC: 6.6 G/DL (ref 6.3–8)
PROT UR STRIP-MCNC: NEGATIVE MG/DL
RBC # BLD AUTO: 4.78 M/UL (ref 4.7–6.1)
SALICYLATES SERPL-MCNC: <0.3 MG/DL (ref 15–30)
SODIUM SERPL-SCNC: 141 MEQ/L (ref 135–144)
SP GR UR STRIP: 1.02 (ref 1–1.03)
TRIGL SERPL-MCNC: 180 MG/DL (ref 0–150)
TSH SERPL-MCNC: 1.17 UIU/ML (ref 0.44–3.86)
URINE REFLEX TO CULTURE: ABNORMAL
UROBILINOGEN UR STRIP-ACNC: 1 E.U./DL
WBC # BLD AUTO: 6.8 K/UL (ref 4.8–10.8)

## 2023-08-21 PROCEDURE — 85025 COMPLETE CBC W/AUTO DIFF WBC: CPT

## 2023-08-21 PROCEDURE — 81003 URINALYSIS AUTO W/O SCOPE: CPT

## 2023-08-21 PROCEDURE — 82550 ASSAY OF CK (CPK): CPT

## 2023-08-21 PROCEDURE — 84443 ASSAY THYROID STIM HORMONE: CPT

## 2023-08-21 PROCEDURE — 80061 LIPID PANEL: CPT

## 2023-08-21 PROCEDURE — 36415 COLL VENOUS BLD VENIPUNCTURE: CPT

## 2023-08-21 PROCEDURE — 80053 COMPREHEN METABOLIC PANEL: CPT

## 2023-08-21 PROCEDURE — 99285 EMERGENCY DEPT VISIT HI MDM: CPT

## 2023-08-21 PROCEDURE — 80307 DRUG TEST PRSMV CHEM ANLYZR: CPT

## 2023-08-21 PROCEDURE — 80179 DRUG ASSAY SALICYLATE: CPT

## 2023-08-21 PROCEDURE — 80143 DRUG ASSAY ACETAMINOPHEN: CPT

## 2023-08-21 PROCEDURE — 6370000000 HC RX 637 (ALT 250 FOR IP): Performed by: PHYSICIAN ASSISTANT

## 2023-08-21 PROCEDURE — 82077 ASSAY SPEC XCP UR&BREATH IA: CPT

## 2023-08-21 PROCEDURE — 1240000000 HC EMOTIONAL WELLNESS R&B

## 2023-08-21 RX ORDER — HALOPERIDOL 5 MG/1
5 TABLET ORAL ONCE
Status: COMPLETED | OUTPATIENT
Start: 2023-08-21 | End: 2023-08-21

## 2023-08-21 RX ADMIN — HALOPERIDOL 5 MG: 5 TABLET ORAL at 21:59

## 2023-08-21 ASSESSMENT — LIFESTYLE VARIABLES
HOW OFTEN DO YOU HAVE A DRINK CONTAINING ALCOHOL: NEVER
HOW MANY STANDARD DRINKS CONTAINING ALCOHOL DO YOU HAVE ON A TYPICAL DAY: PATIENT DOES NOT DRINK

## 2023-08-21 ASSESSMENT — ENCOUNTER SYMPTOMS
VOMITING: 0
EYE DISCHARGE: 0
COUGH: 0
ABDOMINAL PAIN: 0
ABDOMINAL DISTENTION: 0
NAUSEA: 0
VOICE CHANGE: 0

## 2023-08-21 ASSESSMENT — PATIENT HEALTH QUESTIONNAIRE - PHQ9: SUM OF ALL RESPONSES TO PHQ QUESTIONS 1-9: 16

## 2023-08-21 NOTE — ED NOTES
Skin check was completed by writer. Changed into hospital clothes. Uriine specimen given by patient and sent to lab. Providers notified,of need for assessment. Ascencion Cardona Read  08/21/23 1934

## 2023-08-22 PROBLEM — F25.0 SCHIZOAFFECTIVE DISORDER, BIPOLAR TYPE (HCC): Status: ACTIVE | Noted: 2023-08-22

## 2023-08-22 PROBLEM — F31.9 BIPOLAR 1 DISORDER (HCC): Status: ACTIVE | Noted: 2023-08-22

## 2023-08-22 PROCEDURE — 93005 ELECTROCARDIOGRAM TRACING: CPT | Performed by: PSYCHIATRY & NEUROLOGY

## 2023-08-22 PROCEDURE — 6370000000 HC RX 637 (ALT 250 FOR IP): Performed by: PSYCHIATRY & NEUROLOGY

## 2023-08-22 PROCEDURE — 1240000000 HC EMOTIONAL WELLNESS R&B

## 2023-08-22 RX ORDER — HALOPERIDOL 5 MG/1
5 TABLET ORAL EVERY 4 HOURS PRN
Status: DISCONTINUED | OUTPATIENT
Start: 2023-08-22 | End: 2023-08-26 | Stop reason: HOSPADM

## 2023-08-22 RX ORDER — ACETAMINOPHEN 325 MG/1
650 TABLET ORAL EVERY 4 HOURS PRN
Status: DISCONTINUED | OUTPATIENT
Start: 2023-08-22 | End: 2023-08-26 | Stop reason: HOSPADM

## 2023-08-22 RX ORDER — BENZTROPINE MESYLATE 1 MG/ML
2 INJECTION INTRAMUSCULAR; INTRAVENOUS 2 TIMES DAILY PRN
Status: DISCONTINUED | OUTPATIENT
Start: 2023-08-22 | End: 2023-08-26 | Stop reason: HOSPADM

## 2023-08-22 RX ORDER — DIVALPROEX SODIUM 500 MG/1
500 TABLET, EXTENDED RELEASE ORAL
Status: DISCONTINUED | OUTPATIENT
Start: 2023-08-22 | End: 2023-08-26 | Stop reason: HOSPADM

## 2023-08-22 RX ORDER — HALOPERIDOL 5 MG/ML
5 INJECTION INTRAMUSCULAR EVERY 4 HOURS PRN
Status: DISCONTINUED | OUTPATIENT
Start: 2023-08-22 | End: 2023-08-26 | Stop reason: HOSPADM

## 2023-08-22 RX ORDER — TRAZODONE HYDROCHLORIDE 50 MG/1
50 TABLET ORAL NIGHTLY PRN
Status: DISCONTINUED | OUTPATIENT
Start: 2023-08-22 | End: 2023-08-26 | Stop reason: HOSPADM

## 2023-08-22 RX ORDER — HYDROXYZINE HYDROCHLORIDE 50 MG/ML
50 INJECTION, SOLUTION INTRAMUSCULAR EVERY 6 HOURS PRN
Status: DISCONTINUED | OUTPATIENT
Start: 2023-08-22 | End: 2023-08-26 | Stop reason: HOSPADM

## 2023-08-22 RX ORDER — MAGNESIUM HYDROXIDE/ALUMINUM HYDROXICE/SIMETHICONE 120; 1200; 1200 MG/30ML; MG/30ML; MG/30ML
30 SUSPENSION ORAL PRN
Status: DISCONTINUED | OUTPATIENT
Start: 2023-08-22 | End: 2023-08-26 | Stop reason: HOSPADM

## 2023-08-22 RX ORDER — PALIPERIDONE 3 MG/1
3 TABLET, EXTENDED RELEASE ORAL DAILY
Status: DISCONTINUED | OUTPATIENT
Start: 2023-08-22 | End: 2023-08-24

## 2023-08-22 RX ORDER — HYDROXYZINE PAMOATE 50 MG/1
50 CAPSULE ORAL EVERY 6 HOURS PRN
Status: DISCONTINUED | OUTPATIENT
Start: 2023-08-22 | End: 2023-08-26 | Stop reason: HOSPADM

## 2023-08-22 RX ORDER — NICOTINE 21 MG/24HR
1 PATCH, TRANSDERMAL 24 HOURS TRANSDERMAL DAILY
Status: DISCONTINUED | OUTPATIENT
Start: 2023-08-22 | End: 2023-08-26 | Stop reason: HOSPADM

## 2023-08-22 RX ADMIN — DIVALPROEX SODIUM 500 MG: 500 TABLET, EXTENDED RELEASE ORAL at 18:18

## 2023-08-22 RX ADMIN — PALIPERIDONE 3 MG: 3 TABLET, EXTENDED RELEASE ORAL at 13:06

## 2023-08-22 RX ADMIN — HYDROXYZINE PAMOATE 50 MG: 50 CAPSULE ORAL at 09:21

## 2023-08-22 RX ADMIN — HALOPERIDOL 5 MG: 5 TABLET ORAL at 20:57

## 2023-08-22 ASSESSMENT — SLEEP AND FATIGUE QUESTIONNAIRES
DO YOU HAVE DIFFICULTY SLEEPING: YES
DO YOU USE A SLEEP AID: NO
SLEEP PATTERN: INSOMNIA

## 2023-08-22 ASSESSMENT — LIFESTYLE VARIABLES
HOW MANY STANDARD DRINKS CONTAINING ALCOHOL DO YOU HAVE ON A TYPICAL DAY: 3 OR 4
HOW OFTEN DO YOU HAVE A DRINK CONTAINING ALCOHOL: MONTHLY OR LESS

## 2023-08-22 ASSESSMENT — PATIENT HEALTH QUESTIONNAIRE - PHQ9: SUM OF ALL RESPONSES TO PHQ QUESTIONS 1-9: 16

## 2023-08-22 NOTE — CONSULTS
MEDICAL HISTORY AND PHYSICAL             Date: 8/22/2023        Patient Name: Erica Spears     YOB: 1990      Age:  35 y.o. Chief Complaint     Chief Complaint   Patient presents with    Hallucinations     Pt states he is having auditory hallucinations x2 weeks       History Obtained From   patient, electronic medical record    History of Present Illness   Daisy Aguilar is a 70-year-old  male with significant past medical history of depression, substance abuse, hyperlipidemia, who was admitted for worsening depression, suicidal ideation, and further psychiatric evaluation of auditory hallucination. At the time of examination, patient claims depression and anxiety, but denies current suicidal as well as any presence of visual and tactile hallucinations. Patient claims that for the last 2 weeks he has been off and on hearing voices of his father and brother. He denies headaches, dizziness, shortness of breath, chest pain, and N/V/D. Past Medical History     Past Medical History:   Diagnosis Date    Drug abuse ([address])     Hyperlipidemia     Psychiatric problem       Past Surgical History     Past Surgical History:   Procedure Laterality Date    APPENDECTOMY          Medications Prior to Admission     Prior to Admission medications    Medication Sig Start Date End Date Taking?  Authorizing Provider   VORTIoxetine (TRINTELLIX) 5 MG tablet Take 3 tablets by mouth daily  Patient not taking: Reported on 8/22/2023 3/30/21   Jamari Merino MD   OLANZapine (ZYPREXA) 10 MG tablet Take 1 tablet by mouth nightly  Patient not taking: Reported on 8/22/2023 3/29/21   Jamari Merino MD   traZODone (DESYREL) 50 MG tablet Take 1 tablet by mouth nightly as needed for Sleep  Patient not taking: Reported on 8/22/2023 3/29/21   Jamari Merino MD   famotidine (PEPCID) 40 MG tablet Take 1 tablet by mouth daily  Patient not taking: Reported on 8/22/2023 3/18/19   ZENON Lombardi NP        Allergies

## 2023-08-22 NOTE — PROGRESS NOTES
Patient had a flat affect, was worrisome but he was cooperative and agreeable to do the leisure assessment. He is depressed and stressed. Patient is having racing and paranoid thoughts. He is fearful something is going to happen to him. He was hearing his  father and brother's voices telling him they will see him soon. Patient stated \"I don't want to die\". He has bad anxiety and was having panic attacks. He has no legal issues but he is on probation. He drinks alcohol occasionally. He stated he has been clean from drugs for two years. Patient stated he was feeling overwhelmed and was all over the place. Low appetite. His sleeping fluctuates. He enjoys cooking and being with his son. Resource folder was given.  Electronically signed by Jessika Hill [facility] on 2023 at 2:45 PM

## 2023-08-22 NOTE — ED NOTES
Patient resting quietly respirations are even and unlabored no distress noted at this time.        Batsheva Miguel RN  08/21/23 0487

## 2023-08-22 NOTE — ED NOTES
Patient resting quietly. Respirations are even and unlabored. No distress noted at this time.       Danica Cheatham RN  08/21/23 2587

## 2023-08-22 NOTE — PROGRESS NOTES
Pt is noted resting in bed, explained and gave newly ordered invega, med ed print out given, pt reports am vistaril that was given was helpful for anxiety, pt stated this am depression #7 and anxiety was #10, that is now #5, pt denied suicidal thoughts, reports voices this morning were telling him negative comments, to cross over .

## 2023-08-22 NOTE — PROGRESS NOTES
Behavioral Services  Medicare Certification Upon Admission    I certify that this patient's inpatient psychiatric hospital admission is medically necessary for:    [x] (1) Treatment which could reasonably be expected to improve this patient's condition,       [x] (2) Or for diagnostic study;     AND     [x](2) The inpatient psychiatric services are provided while the individual is under the care of a physician and are included in the individualized plan of care.     Estimated length of stay/service 3-5    Plan for post-hospital care Op care    Electronically signed by Cassaundra Klinefelter, MD on 8/22/2023 at 11:12 AM
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
## 2023-08-22 NOTE — PLAN OF CARE
Problem: Risk for Elopement  Goal: Patient will not exit the unit/facility without proper excort  8/22/2023 0751 by Aminah Morris RN  Outcome: Progressing  8/22/2023 0034 by Marsha Sullivan RN  Outcome: Not Progressing  Flowsheets  Taken 8/22/2023 0031  Nursing Interventions for Elopement Risk:   Assist with personal care needs such as toileting, eating, dressing, as needed to reduce the risk of wandering   Escort with two staff members if patient must leave the unit   Collaborate with family members/caregivers to mitigate the elopement risk   Make sure patient has all necessary personal care items   Shoes and clothing collected and placed in gown attire  Taken 8/22/2023 0019  Nursing Interventions for Elopement Risk:   Assist with personal care needs such as toileting, eating, dressing, as needed to reduce the risk of wandering   Escort with two staff members if patient must leave the unit   Collaborate with family members/caregivers to mitigate the elopement risk   Make sure patient has all necessary personal care items   Shoes and clothing collected and placed in gown attire     Problem: Coping  Goal: Pt/Family able to verbalize concerns and demonstrate effective coping strategies  Description: INTERVENTIONS:  1. Assist patient/family to identify coping skills, available support systems and cultural and spiritual values  2. Provide emotional support, including active listening and acknowledgement of concerns of patient and caregivers  3. Reduce environmental stimuli, as able  4. Instruct patient/family in relaxation techniques, as appropriate  5.  Assess for spiritual pain/suffering and initiate Spiritual Care, Psychosocial Clinical Specialist consults as needed  8/22/2023 1652 by Aminah Morris RN  Outcome: Progressing  8/22/2023 0034 by Marsha Sullivan RN  Outcome: Not Progressing     Problem: Decision Making  Goal: Pt/Family able to effectively weigh alternatives and participate in decision making related to treatment and care  Description: INTERVENTIONS:  1. Determine when there are differences between patient's view, family's view, and healthcare provider's view of condition  2. Facilitate patient and family articulation of goals for care  3. Help patient and family identify pros/cons of alternative solutions  4. Provide information as requested by patient/family  5. Respect patient/family right to receive or not to receive information  6. Serve as a liaison between patient and family and health care team  7. Initiate Consults from Ethics, Palliative Care or initiate [id number] as is appropriate  8/22/2023 2351 by Jeanmarie Johnson RN  Outcome: Progressing  8/22/2023 0034 by Cat Sharpe RN  Outcome: Not Progressing     Problem: Depression/Self Harm  Goal: Effect of psychiatric condition will be minimized and patient will be protected from self harm  Description: INTERVENTIONS:  1. Assess impact of patient's symptoms on level of functioning, self care needs and offer support as indicated  2. Assess patient/family knowledge of depression, impact on illness and need for teaching  3. Provide emotional support, presence and reassurance  4. Assess for possible suicidal thoughts or ideation. If patient expresses suicidal thoughts or statements do not leave alone, initiate Suicide Precautions, move to a room close to the nursing station and obtain sitter  5. Initiate consults as appropriate with Mental Health Professional, Spiritual Care, Psychosocial CNS, and consider a recommendation to the LIP for a Psychiatric Consultation  8/22/2023 0751 by Jeanmarie Johnson RN  Outcome: Progressing  8/22/2023 0034 by Cat Sharpe RN  Outcome: Not Progressing     Problem: Involuntary Admit  Goal: Will cooperate with staff recommendations and doctor's orders and will demonstrate appropriate behavior  Description: INTERVENTIONS:  1. Treat underlying conditions and offer medication as ordered  2.  Educate regarding involuntary

## 2023-08-22 NOTE — PROGRESS NOTES
Pt. refused to attend the 1000 skills group, despite staff encouragement.  Electronically signed by Saurabh Palma [facility] on 8/22/2023 at 11:44 AM

## 2023-08-22 NOTE — PROGRESS NOTES
Pt. declined to attend the 0900 community meeting, despite staff encouragement.  Goal - \"Express myself and be open\" Electronically signed by ELYSE Johnson on 8/22/2023 at 9:49 AM

## 2023-08-22 NOTE — ED NOTES
Report called to Elizabeth Barrios RN on [facility] for escort      Delmi Loredo [place]  08/21/23 5013
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
## 2023-08-22 NOTE — ED NOTES
Patient resting quietly respirations are even and unlabored no distress noted at this time.        Brina Sandoval RN  08/21/23 2028

## 2023-08-22 NOTE — PLAN OF CARE
Patient arrived to unit and skin/contraband check completed. Patient a/o x4. He is pleasant and cooperative. E rates his anxiety 7 and depression 10 on a 10 point scale where 10 is the worst.  Patient denies SI, HI, and hallucinations though he was having VH and AH in the BAC prior to haldol. Patient is hungry and given a snack of sandwich, pretzels, cookies, and pop. Patient completed admission consents. He is familiar with the unit. He states he is afraid of dying because hearing the voices and seeing his dad and  brother make him feel like he is going to die. He wants to live. He has a 3year old and a baby on the way. He currently lives with a friend. He coparents with the mother of his children. He filled out menu for breakfast. He does not take any medications-states he was supposed to but did not find they were helping. He currently denies needs at this time. He is encouraged to notify staff of any needs.      Problem: Risk for Elopement  Goal: Patient will not exit the unit/facility without proper excort  Outcome: Not Progressing  Flowsheets  Taken 2023 0031  Nursing Interventions for Elopement Risk:   Assist with personal care needs such as toileting, eating, dressing, as needed to reduce the risk of wandering   Escort with two staff members if patient must leave the unit   Collaborate with family members/caregivers to mitigate the elopement risk   Make sure patient has all necessary personal care items   Shoes and clothing collected and placed in gown attire  Taken 2023 0019  Nursing Interventions for Elopement Risk:   Assist with personal care needs such as toileting, eating, dressing, as needed to reduce the risk of wandering   Escort with two staff members if patient must leave the unit   Collaborate with family members/caregivers to mitigate the elopement risk   Make sure patient has all necessary personal care items   Shoes and clothing collected and placed in gown attire initiate Suicide Precautions, move to a room close to the nursing station and obtain sitter  5. Initiate consults as appropriate with Mental Health Professional, Spiritual Care, Psychosocial CNS, and consider a recommendation to the LIP for a Psychiatric Consultation  Outcome: Not Progressing     Problem: Involuntary Admit  Goal: Will cooperate with staff recommendations and doctor's orders and will demonstrate appropriate behavior  Description: INTERVENTIONS:  1. Treat underlying conditions and offer medication as ordered  2. Educate regarding involuntary admission procedures and rules  3.  Contain excessive/inappropriate behavior per unit and hospital policies  Outcome: Not Progressing
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
## 2023-08-23 PROCEDURE — 6370000000 HC RX 637 (ALT 250 FOR IP): Performed by: PSYCHIATRY & NEUROLOGY

## 2023-08-23 PROCEDURE — 1240000000 HC EMOTIONAL WELLNESS R&B

## 2023-08-23 RX ADMIN — PALIPERIDONE 3 MG: 3 TABLET, EXTENDED RELEASE ORAL at 10:43

## 2023-08-23 RX ADMIN — DIVALPROEX SODIUM 500 MG: 500 TABLET, EXTENDED RELEASE ORAL at 17:45

## 2023-08-23 RX ADMIN — HALOPERIDOL 5 MG: 5 TABLET ORAL at 18:32

## 2023-08-23 RX ADMIN — TRAZODONE HYDROCHLORIDE 50 MG: 50 TABLET ORAL at 21:13

## 2023-08-23 NOTE — PROGRESS NOTES
On the 10 point scale with #10 being the highest, the pt rated his depression a #8/10 and anxiety a #9/10. The pt denies SI/HI and contracts for safety on he unit. The pt reports auditory hallucinations of his  brother's voice. The pt stated, \"My brother tells me I am going to pass over soon\". The pt reports taking a shower today, increased appetite, and poor sleep. The pt isolates to his room and does not go to groups.

## 2023-08-23 NOTE — PROGRESS NOTES
Pt is noted napping this am, awoken and explained and gave am invega, pt denied questions, reports depression ,anxiety # 3, denied voices or suicidal thoughts, pt appears irritated, refused offer for vistaril, denied pain, reports sleeping well,

## 2023-08-23 NOTE — PROGRESS NOTES
Pt. declined to attend the 0900 community meeting, despite staff encouragement.  Goal - \"To have a discharge plan\" Electronically signed by Mansoor Anderson [address] [place] [address] on 8/23/2023 at 9:43 AM

## 2023-08-23 NOTE — PLAN OF CARE
Problem: Coping  Goal: Pt/Family able to verbalize concerns and demonstrate effective coping strategies  Description: INTERVENTIONS:  1. Assist patient/family to identify coping skills, available support systems and cultural and spiritual values  2. Provide emotional support, including active listening and acknowledgement of concerns of patient and caregivers  3. Reduce environmental stimuli, as able  4. Instruct patient/family in relaxation techniques, as appropriate  5. Assess for spiritual pain/suffering and initiate Spiritual Care, Psychosocial Clinical Specialist consults as needed  Outcome: Not Progressing     Problem: Decision Making  Goal: Pt/Family able to effectively weigh alternatives and participate in decision making related to treatment and care  Description: INTERVENTIONS:  1. Determine when there are differences between patient's view, family's view, and healthcare provider's view of condition  2. Facilitate patient and family articulation of goals for care  3. Help patient and family identify pros/cons of alternative solutions  4. Provide information as requested by patient/family  5. Respect patient/family right to receive or not to receive information  6. Serve as a liaison between patient and family and health care team  7. Initiate Consults from Ethics, Palliative Care or initiate [phone] as is appropriate  Outcome: Not Progressing     Problem: Involuntary Admit  Goal: Will cooperate with staff recommendations and doctor's orders and will demonstrate appropriate behavior  Description: INTERVENTIONS:  1. Treat underlying conditions and offer medication as ordered  2. Educate regarding involuntary admission procedures and rules  3.  Contain excessive/inappropriate behavior per unit and hospital policies  Outcome: Not Progressing

## 2023-08-23 NOTE — PLAN OF CARE
Problem: Risk for Elopement  Goal: Patient will not exit the unit/facility without proper excort  Outcome: Progressing  Flowsheets (Taken 8/23/2023 1532)  Nursing Interventions for Elopement Risk:   Reduce environmental triggers   Make sure patient has all necessary personal care items     Problem: Coping  Goal: Pt/Family able to verbalize concerns and demonstrate effective coping strategies  Description: INTERVENTIONS:  1. Assist patient/family to identify coping skills, available support systems and cultural and spiritual values  2. Provide emotional support, including active listening and acknowledgement of concerns of patient and caregivers  3. Reduce environmental stimuli, as able  4. Instruct patient/family in relaxation techniques, as appropriate  5. Assess for spiritual pain/suffering and initiate Spiritual Care, Psychosocial Clinical Specialist consults as needed  Outcome: Progressing  Flowsheets (Taken 8/23/2023 1532)  Patient/family able to verbalize anxieties, fears, and concerns, and demonstrate effective coping:   Reduce environmental stimuli, as able   Provide emotional support, including active listening and acknowledgement of concerns of patient and caregivers     Problem: Decision Making  Goal: Pt/Family able to effectively weigh alternatives and participate in decision making related to treatment and care  Description: INTERVENTIONS:  1. Determine when there are differences between patient's view, family's view, and healthcare provider's view of condition  2. Facilitate patient and family articulation of goals for care  3. Help patient and family identify pros/cons of alternative solutions  4. Provide information as requested by patient/family  5. Respect patient/family right to receive or not to receive information  6. Serve as a liaison between patient and family and health care team  7.  Initiate Consults from Ethics, Palliative Care or initiate [phone] as is

## 2023-08-23 NOTE — PROGRESS NOTES
[address] [place] FOLLOW-UP NOTE       8/23/2023     Patient was seen and examined in person, Chart reviewed   Patient's case discussed with staff/team    Chief Complaint: AH, psychosis    Interim History:     Paranoia improving, states no longer hear AH of brother and father who have passed saying his time was coming  Racing thoughts improving  Slept well after receiving haldol yesterday  Adequate appetite  Depression and anxiety improving   Fair eye contact  Isolative to room  Denies attending groups but reports been in deep prayer  States symptoms increase as anxiety increases  Will consider ROMERO prior to discharge     Appetite:   [x] Normal/Unchanged  [] Increased  [] Decreased      Sleep:       [x] Normal/Unchanged  [] Fair       [] Poor              Energy:    [x] Normal/Unchanged  [] Increased  [] Decreased        SI [] Present  [x] Absent    HI  []Present  [x] Absent     Aggression:  [] yes  [x] no    Patient is [x] able  [] unable to CONTRACT FOR SAFETY     PAST MEDICAL/PSYCHIATRIC HISTORY:   Past Medical History:   Diagnosis Date    Drug abuse ([address])     Hyperlipidemia     Psychiatric problem        FAMILY/SOCIAL HISTORY:  Family History   Family history unknown: Yes     Social History     Socioeconomic History    Marital status: Single     Spouse name: Not on file    Number of children: 0    Years of education: 12    Highest education level: Not on file   Occupational History    Not on file   Tobacco Use    Smoking status: Every Day     Packs/day: 0.50     Types: Cigarettes    Smokeless tobacco: Never   Vaping Use    Vaping Use: Every day    Substances: Nicotine, THC    Devices: Disposable, Pre-filled or refillable cartridge   Substance and Sexual Activity    Alcohol use: Yes     Comment: occasionally    Drug use: Not Currently     Types: Marijuana Vince Inch), Opiates     Sexual activity: Yes     Partners: Female   Other Topics Concern    Not on file   Social History

## 2023-08-23 NOTE — PROGRESS NOTES
Pt. refused to attend the 1000 skills group, despite staff encouragement.  Electronically signed by Lisa Webber, [address] [place] [address] on 8/23/2023 at 1:09 PM

## 2023-08-23 NOTE — PROGRESS NOTES
Patient requested medication for audio hallucinations. He did not go into details about the hallucinations but did say he was going to try to sleep. Haldol 5mg given PO.

## 2023-08-24 LAB
EKG ATRIAL RATE: 72 BPM
EKG P AXIS: 65 DEGREES
EKG P-R INTERVAL: 126 MS
EKG Q-T INTERVAL: 388 MS
EKG QRS DURATION: 94 MS
EKG QTC CALCULATION (BAZETT): 424 MS
EKG R AXIS: 81 DEGREES
EKG T AXIS: 57 DEGREES
EKG VENTRICULAR RATE: 72 BPM

## 2023-08-24 PROCEDURE — 6370000000 HC RX 637 (ALT 250 FOR IP): Performed by: PSYCHIATRY & NEUROLOGY

## 2023-08-24 PROCEDURE — 1240000000 HC EMOTIONAL WELLNESS R&B

## 2023-08-24 RX ORDER — PALIPERIDONE 3 MG/1
3 TABLET, EXTENDED RELEASE ORAL ONCE
Status: COMPLETED | OUTPATIENT
Start: 2023-08-24 | End: 2023-08-24

## 2023-08-24 RX ORDER — PALIPERIDONE 6 MG/1
6 TABLET, EXTENDED RELEASE ORAL DAILY
Status: DISCONTINUED | OUTPATIENT
Start: 2023-08-25 | End: 2023-08-26 | Stop reason: HOSPADM

## 2023-08-24 RX ADMIN — DIVALPROEX SODIUM 500 MG: 500 TABLET, EXTENDED RELEASE ORAL at 18:18

## 2023-08-24 RX ADMIN — PALIPERIDONE 3 MG: 3 TABLET, EXTENDED RELEASE ORAL at 12:09

## 2023-08-24 RX ADMIN — TRAZODONE HYDROCHLORIDE 50 MG: 50 TABLET ORAL at 21:15

## 2023-08-24 RX ADMIN — PALIPERIDONE 3 MG: 3 TABLET, EXTENDED RELEASE ORAL at 09:22

## 2023-08-24 NOTE — GROUP NOTE
Group Therapy Note    Date: 8/24/2023    Group Start Time: 1630  Group End Time: 1700  Group Topic: Healthy Living/Wellness    [facility]    Janeth Gomes        Group Therapy Note    Attendees: 8/23       Patient's Goal:  Anger Management      Notes:  pt attentive in group    Status After Intervention:  Unchanged    Participation Level:  Active Listener    Participation Quality: Appropriate, Attentive, Sharing, and Supportive      Speech:  normal      Thought Process/Content: Logical      Affective Functioning: Congruent      Mood: euphoric      Level of consciousness:  Alert, Oriented x4, and Attentive      Response to Learning: Able to verbalize current knowledge/experience, Able to verbalize/acknowledge new learning, Able to retain information, Capable of insight, Able to change behavior, and Progressing to goal      Endings: None Reported    Modes of Intervention: Support      Discipline Responsible: [address] [facility]      Signature:  Nereida Suazo

## 2023-08-24 NOTE — PROGRESS NOTES
Morning Community Meeting Topics    Arrachel Lucas attended the morning community meeting on 8/24/23. Topics discussed today     [x] Introduction  Day of the week and date  Mask distribution  Current mask requirements  [x]Teams  Explanation of  Green and Blue team criteria  Nurses assigned to each team for today  Explanation about green and blue paper  Date  Patient's Name  Patient's Nurse  Goals  [x] Visitation  Announce the visiting hours for the day  Announce which team is allowed to have visitors for the day  Review any updated Covid 19 requirements for visitors during visitation  Vaccine Card or negative Covid test within 48 hours of visit  State Identification  Patients are reminded to alert the  at least 1 hour before visitation   [x] Unit Orientation  Coffee use  Phone location and etiquette  Shower locations  [employer] location and process  Common area expectations  Staff rounds expectation  [x] Meals   Educate patient to the menu  The patient is encouraged to fill out the menu to get preferences at mealtime  The patient is educated that if they do not fill out the menu, they will get the standard tray  The coffee pot is decaf, patient encouraged to order regular coffee from menu.   Educate patient to the meal process  Patient encouraged to eat snacks provided twice daily  Snacks may stay in patient room     [x] Discharge Process  Discharge expectations  Fill out the survey after discharge   [x] Hygiene  Daily showers encouraged  Showers availability discussed   Daily dressing encouraged  Discussed wearing street clothing  Education provided on where to place linens and clothing  Linens in the hamper  personal clothing does not go into the linen hamper  [x] Group   Patient encouraged to attend group provided  Time of Group Meetings discussed  Gentle reminder that attendance is a Physician order  [x] Movement  Chair exercises completed  Stretching completed  Notes: Goal - \"To have a

## 2023-08-24 NOTE — GROUP NOTE
Group Therapy Note    Date: 8/24/2023    Group Start Time: 1500  Group End Time: 0548  Group Topic: Cognitive Skills    [facility]    Janeth Gomes        Group Therapy Note    Attendees: 12/23       Patient's Goal:  ways to control with anxiety     Notes:  pt attentive in group    Status After Intervention:  Unchanged    Participation Level:  Active Listener    Participation Quality: Appropriate, Attentive, Sharing, and Supportive      Speech:  normal      Thought Process/Content: Logical      Affective Functioning: Congruent      Mood: euphoric      Level of consciousness:  Alert, Oriented x4, and Attentive      Response to Learning: Able to verbalize current knowledge/experience, Able to verbalize/acknowledge new learning, Able to retain information, Capable of insight, Able to change behavior, and Progressing to goal      Endings: None Reported    Modes of Intervention: Education      Discipline Responsible: [address] [facility]      Signature:  Ian Wilson

## 2023-08-24 NOTE — GROUP NOTE
Group Therapy Note    Date: 8/23/2023    Group Start Time: 2100  Group End Time: 2130  Group Topic: Wrap-Up    [facility]    Janeth Gomes        Group Therapy Note    Attendees: 18/21       Patient's Goal:  to write a goal for tomorrow     Notes:  pt was attentive in group    Status After Intervention:  Unchanged    Participation Level:  Active Listener    Participation Quality: Appropriate, Attentive, Sharing, and Supportive      Speech:  normal      Thought Process/Content: Logical      Affective Functioning: Congruent      Mood: euphoric      Level of consciousness:  Alert, Oriented x4, and Attentive      Response to Learning: Able to verbalize current knowledge/experience, Able to verbalize/acknowledge new learning, Able to retain information, Capable of insight, Able to change behavior, and Progressing to goal      Endings: None Reported    Modes of Intervention: Socialization      Discipline Responsible: [address] [facility]      Signature:  Donato Ruiz

## 2023-08-24 NOTE — PROGRESS NOTES
On a 10 point scale with #10 being the highest, the pt rated his depression a #1/10 and his anxiety a #1/10. The pt denied SI/HI/AVH and contracts for safety on the unit. The pt reports showering multiple times a day, increased appetite, and fair sleep. The pt is seen out on the unit social with select peers and goes to groups.

## 2023-08-24 NOTE — PLAN OF CARE
Problem: Risk for Elopement  Goal: Patient will not exit the unit/facility without proper excort  8/23/2023 2120 by Fina Rivera RN  Outcome: Progressing  8/23/2023 1534 by Sandra Mckeon RN  Outcome: Progressing  Flowsheets (Taken 8/23/2023 1532)  Nursing Interventions for Elopement Risk:   Reduce environmental triggers   Make sure patient has all necessary personal care items     Problem: Coping  Goal: Pt/Family able to verbalize concerns and demonstrate effective coping strategies  Description: INTERVENTIONS:  1. Assist patient/family to identify coping skills, available support systems and cultural and spiritual values  2. Provide emotional support, including active listening and acknowledgement of concerns of patient and caregivers  3. Reduce environmental stimuli, as able  4. Instruct patient/family in relaxation techniques, as appropriate  5. Assess for spiritual pain/suffering and initiate Spiritual Care, Psychosocial Clinical Specialist consults as needed  8/23/2023 2120 by Fina Rivera RN  Outcome: Progressing  8/23/2023 1534 by Sandra Mckeon RN  Outcome: Progressing  Flowsheets (Taken 8/23/2023 1532)  Patient/family able to verbalize anxieties, fears, and concerns, and demonstrate effective coping:   Reduce environmental stimuli, as able   Provide emotional support, including active listening and acknowledgement of concerns of patient and caregivers     Problem: Decision Making  Goal: Pt/Family able to effectively weigh alternatives and participate in decision making related to treatment and care  Description: INTERVENTIONS:  1. Determine when there are differences between patient's view, family's view, and healthcare provider's view of condition  2. Facilitate patient and family articulation of goals for care  3. Help patient and family identify pros/cons of alternative solutions  4. Provide information as requested by patient/family  5.  Respect patient/family right to receive or not to receive

## 2023-08-24 NOTE — PROGRESS NOTES
Pt's  Leeland Dixon called and asked for an update. She needs notified before discharge.  [phone]

## 2023-08-24 NOTE — PROGRESS NOTES
[address] [place] FOLLOW-UP NOTE       8/24/2023     Patient was seen and examined in person, Chart reviewed   Patient's case discussed with staff/team    Chief Complaint: AH, psychosis    Interim History:     Pt report feeling less paranoid  AH bothering him last evening needing prn haldol  Has been talking with his son mother  Sleep better last night  Less depressed  Pt is missing son and want to have visit with his son    Appetite:   [x] Normal/Unchanged  [] Increased  [] Decreased      Sleep:       [x] Normal/Unchanged  [] Fair       [] Poor              Energy:    [x] Normal/Unchanged  [] Increased  [] Decreased        SI [] Present  [x] Absent    HI  []Present  [x] Absent     Aggression:  [] yes  [x] no    Patient is [x] able  [] unable to CONTRACT FOR SAFETY     PAST MEDICAL/PSYCHIATRIC HISTORY:   Past Medical History:   Diagnosis Date    Drug abuse ([address])     Hyperlipidemia     Psychiatric problem        FAMILY/SOCIAL HISTORY:  Family History   Family history unknown: Yes     Social History     Socioeconomic History    Marital status: Single     Spouse name: Not on file    Number of children: 0    Years of education: 12    Highest education level: Not on file   Occupational History    Not on file   Tobacco Use    Smoking status: Every Day     Packs/day: 0.50     Types: Cigarettes    Smokeless tobacco: Never   Vaping Use    Vaping Use: Every day    Substances: Nicotine, THC    Devices: Disposable, Pre-filled or refillable cartridge   Substance and Sexual Activity    Alcohol use: Yes     Comment: occasionally    Drug use: Not Currently     Types: Marijuana Jacquenette Pun), Opiates     Sexual activity: Yes     Partners: Female   Other Topics Concern    Not on file   Social History Narrative    Not on file     Social Determinants of Health     Financial Resource Strain: Not on file   Food Insecurity: Not on file   Transportation Needs: Not on file   Physical Activity: Not on file other milieu activities.   Discharge planning discussed with the patient and treatment team.    PSYCHOTHERAPY/COUNSELING:  [x] Therapeutic interview  [x] Supportive  [] CBT  [] Ongoing  [] Other    [x] Patient continues to need, on a daily basis, active treatment furnished directly by or requiring the supervision of inpatient psychiatric personnel      Anticipated Length of stay:        Electronically signed by Newton Magaña MD on 8/24/2023 at 11:17 AM

## 2023-08-24 NOTE — PROGRESS NOTES
Pt. refused to attend the 1000 skills group, despite staff encouragement.  Electronically signed by Jessika Hill, [address] [place] [address] on 8/24/2023 at 12:46 PM

## 2023-08-25 LAB — VALPROATE SERPL-MCNC: 23.6 UG/ML (ref 50–100)

## 2023-08-25 PROCEDURE — 80164 ASSAY DIPROPYLACETIC ACD TOT: CPT

## 2023-08-25 PROCEDURE — 6370000000 HC RX 637 (ALT 250 FOR IP): Performed by: PSYCHIATRY & NEUROLOGY

## 2023-08-25 PROCEDURE — 1240000000 HC EMOTIONAL WELLNESS R&B

## 2023-08-25 PROCEDURE — 36415 COLL VENOUS BLD VENIPUNCTURE: CPT

## 2023-08-25 RX ADMIN — TRAZODONE HYDROCHLORIDE 50 MG: 50 TABLET ORAL at 20:56

## 2023-08-25 RX ADMIN — PALIPERIDONE 6 MG: 6 TABLET, EXTENDED RELEASE ORAL at 09:35

## 2023-08-25 RX ADMIN — DIVALPROEX SODIUM 500 MG: 500 TABLET, EXTENDED RELEASE ORAL at 18:21

## 2023-08-25 NOTE — PROGRESS NOTES
Morning Community Meeting Topics    Allen Sotelo attended the morning community meeting on 8/25/23. Topics discussed today     [x] Introduction  Day of the week and date  Mask distribution  Current mask requirements  [x]Teams  Explanation of  Green and Blue team criteria  Nurses assigned to each team for today  Explanation about green and blue paper  Date  Patient's Name  Patient's Nurse  Goals  [x] Visitation  Announce the visiting hours for the day  Announce which team is allowed to have visitors for the day  Review any updated Covid 19 requirements for visitors during visitation  Vaccine Card or negative Covid test within 48 hours of visit  State Identification  Patients are reminded to alert the  at least 1 hour before visitation   [x] Unit Orientation  Coffee use  Phone location and etiquette  Shower locations  Howard and dryer location and process  Common area expectations  Staff rounds expectation  [x] Meals   Educate patient to the menu  The patient is encouraged to fill out the menu to get preferences at mealtime  The patient is educated that if they do not fill out the menu, they will get the standard tray  The coffee pot is decaf, patient encouraged to order regular coffee from menu.   Educate patient to the meal process  Patient encouraged to eat snacks provided twice daily  Snacks may stay in patient room     [x] Discharge Process  Discharge expectations  Fill out the survey after discharge   [x] Hygiene  Daily showers encouraged  Showers availability discussed   Daily dressing encouraged  Discussed wearing street clothing  Education provided on where to place linens and clothing  Linens in the hamper  personal clothing does not go into the linen hamper  [x] Group   Patient encouraged to attend group provided  Time of Group Meetings discussed  Gentle reminder that attendance is a Physician order  [x] Movement  Chair exercises completed  Stretching completed  Notes:  GOAL : \" to write down my priorities\" Electronically signed by Faisal Farah on 8/25/2023 at 12:47 PM

## 2023-08-25 NOTE — GROUP NOTE
Group Therapy Note    Date: 8/25/2023    Group Start Time: 1600  Group End Time: 1700  Group Topic: Healthy Living/Wellness    [facility]    Janeth Gomes        Group Therapy Note    Attendees: 15/22       Patient's Goal:  Different ways of self care and why its important     Notes:  pt attentive in group    Status After Intervention:  Unchanged    Participation Level:  Active Listener    Participation Quality: Appropriate, Attentive, Sharing, and Supportive      Speech:  normal      Thought Process/Content: Logical      Affective Functioning: Congruent      Mood: euphoric      Level of consciousness:  Alert, Oriented x4, and Attentive      Response to Learning: Able to verbalize current knowledge/experience, Able to verbalize/acknowledge new learning, Able to retain information, Capable of insight, Able to change behavior, and Progressing to goal      Endings: None Reported    Modes of Intervention: Education      Discipline Responsible: [address] [facility]      Signature:  Deepali Guadalupe

## 2023-08-25 NOTE — PLAN OF CARE
Problem: Risk for Elopement  Goal: Patient will not exit the unit/facility without proper excort  8/25/2023 1954 by Ezequiel Duron RN  Outcome: Progressing  8/25/2023 1121 by Ezequiel Duron RN  Outcome: Progressing  Flowsheets (Taken 8/25/2023 1119)  Nursing Interventions for Elopement Risk: Reduce environmental triggers     Problem: Coping  Goal: Pt/Family able to verbalize concerns and demonstrate effective coping strategies  Description: INTERVENTIONS:  1. Assist patient/family to identify coping skills, available support systems and cultural and spiritual values  2. Provide emotional support, including active listening and acknowledgement of concerns of patient and caregivers  3. Reduce environmental stimuli, as able  4. Instruct patient/family in relaxation techniques, as appropriate  5. Assess for spiritual pain/suffering and initiate Spiritual Care, Psychosocial Clinical Specialist consults as needed  8/25/2023 1954 by Ezequiel Duron RN  Outcome: Progressing  8/25/2023 1121 by Ezequiel Duron RN  Outcome: Progressing  Flowsheets (Taken 8/25/2023 1119)  Patient/family able to verbalize anxieties, fears, and concerns, and demonstrate effective coping: Reduce environmental stimuli, as able     Problem: Decision Making  Goal: Pt/Family able to effectively weigh alternatives and participate in decision making related to treatment and care  Description: INTERVENTIONS:  1. Determine when there are differences between patient's view, family's view, and healthcare provider's view of condition  2. Facilitate patient and family articulation of goals for care  3. Help patient and family identify pros/cons of alternative solutions  4. Provide information as requested by patient/family  5. Respect patient/family right to receive or not to receive information  6. Serve as a liaison between patient and family and health care team  7.  Initiate Consults from Ethics, Palliative Care or initiate [facility] as is

## 2023-08-25 NOTE — PLAN OF CARE
Problem: Risk for Elopement  Goal: Patient will not exit the unit/facility without proper excort  8/25/2023 1121 by Saman Gonsales RN  Outcome: Progressing  Flowsheets (Taken 8/25/2023 1119)  Nursing Interventions for Elopement Risk: Reduce environmental triggers  8/24/2023 2141 by Lizet Connors RN  Outcome: Progressing     Problem: Coping  Goal: Pt/Family able to verbalize concerns and demonstrate effective coping strategies  Description: INTERVENTIONS:  1. Assist patient/family to identify coping skills, available support systems and cultural and spiritual values  2. Provide emotional support, including active listening and acknowledgement of concerns of patient and caregivers  3. Reduce environmental stimuli, as able  4. Instruct patient/family in relaxation techniques, as appropriate  5. Assess for spiritual pain/suffering and initiate Spiritual Care, Psychosocial Clinical Specialist consults as needed  8/25/2023 1121 by Saman Gonsales RN  Outcome: Progressing  Flowsheets (Taken 8/25/2023 1119)  Patient/family able to verbalize anxieties, fears, and concerns, and demonstrate effective coping: Reduce environmental stimuli, as able  8/24/2023 2141 by Lizet Connors RN  Outcome: Progressing     Problem: Decision Making  Goal: Pt/Family able to effectively weigh alternatives and participate in decision making related to treatment and care  Description: INTERVENTIONS:  1. Determine when there are differences between patient's view, family's view, and healthcare provider's view of condition  2. Facilitate patient and family articulation of goals for care  3. Help patient and family identify pros/cons of alternative solutions  4. Provide information as requested by patient/family  5. Respect patient/family right to receive or not to receive information  6. Serve as a liaison between patient and family and health care team  7.  Initiate Consults from Ethics, Palliative Care or initiate [phone] as

## 2023-08-25 NOTE — GROUP NOTE
Group Therapy Note    Date: 8/24/2023    Group Start Time: 2100  Group End Time: 2130  Group Topic: Wrap-Up    [facility]    Janeth Gomes        Group Therapy Note    Attendees: 15/23       Patient's Goal:  speak to mom and      Notes:  pt attentive in group    Status After Intervention:  Unchanged    Participation Level:  Active Listener    Participation Quality: Appropriate, Attentive, Sharing, and Supportive      Speech:  normal      Thought Process/Content: Logical      Affective Functioning: Congruent      Mood: euphoric      Level of consciousness:  Alert, Oriented x4, and Attentive      Response to Learning: Able to verbalize current knowledge/experience, Able to verbalize/acknowledge new learning, Able to retain information, Capable of insight, Able to change behavior, and Progressing to goal      Endings: None Reported    Modes of Intervention: Socialization      Discipline Responsible: [address] [facility]      Signature:  Ángel Morales

## 2023-08-25 NOTE — PROGRESS NOTES
Patient out on unit, social with select peers,  Denies all   Sleep and appetite good  He and his partner have 1 child together and 1 due in march, theu both live at a sober living place  Patient goes to day treatment at [address] [facility]  Patient is starting a new job  in [address] and he and his partner are planning on saving a little money, then get a place together, she works also

## 2023-08-25 NOTE — FLOWSHEET NOTE
Pt is alert and oriented x4,patient walks independently with a steady gait. Pt is observed out and social with peers. He rates his depression a 0/10 and anxiety a 0/10 (10-good). He did attend the afternoon group and his goal was \"To write down my priorities\". He reports adequate sleep with Trazodone and improved appetite. He denies SI,HI,AVH.

## 2023-08-25 NOTE — GROUP NOTE
Group Therapy Note    Date: 8/25/2023    Group Start Time: 1000  Group End Time: 1100  Group Topic: Psychoeducation    [facility]    Elenita Morejon, DMITRYS        Group Therapy Note    Attendees: 8       Patient's Goal:  \"to write down my priorities\"    Notes:  Pt. attended the 1000 skill group. Preoccupied. Quiet and to himself. Status After Intervention:  Improved    Participation Level:  Active Listener and Minimal    Participation Quality: Appropriate and Attentive      Speech:  normal      Thought Process/Content: Logical      Affective Functioning: Congruent      Mood:  calm      Level of consciousness:  Alert, Oriented x4, and Preoccupied      Response to Learning: Able to change behavior and Progressing to goal      Endings: None Reported    Modes of Intervention: Education, Support, Socialization, and Activity      Discipline Responsible: Psychoeducational Specialist      Signature:  Iam Hein

## 2023-08-26 VITALS
RESPIRATION RATE: 18 BRPM | DIASTOLIC BLOOD PRESSURE: 72 MMHG | HEART RATE: 72 BPM | WEIGHT: 160 LBS | SYSTOLIC BLOOD PRESSURE: 108 MMHG | HEIGHT: 67 IN | BODY MASS INDEX: 25.11 KG/M2 | TEMPERATURE: 98.1 F | OXYGEN SATURATION: 100 %

## 2023-08-26 PROCEDURE — 6370000000 HC RX 637 (ALT 250 FOR IP): Performed by: PSYCHIATRY & NEUROLOGY

## 2023-08-26 RX ORDER — DIVALPROEX SODIUM 500 MG/1
500 TABLET, EXTENDED RELEASE ORAL
Qty: 15 TABLET | Refills: 3 | Status: SHIPPED | OUTPATIENT
Start: 2023-08-26

## 2023-08-26 RX ORDER — TRAZODONE HYDROCHLORIDE 50 MG/1
50 TABLET ORAL NIGHTLY PRN
Qty: 15 TABLET | Refills: 2 | Status: SHIPPED | OUTPATIENT
Start: 2023-08-26

## 2023-08-26 RX ORDER — PALIPERIDONE 6 MG/1
6 TABLET, EXTENDED RELEASE ORAL DAILY
Qty: 15 TABLET | Refills: 3 | Status: SHIPPED | OUTPATIENT
Start: 2023-08-26

## 2023-08-26 RX ADMIN — PALIPERIDONE 6 MG: 6 TABLET, EXTENDED RELEASE ORAL at 09:05

## 2023-08-26 NOTE — DISCHARGE SUMMARY
DISCHARGE SUMMARY      Patient ID:  Vladimir Mcdermott  [id number]  85 y.o.  1990      Admit date: 2023    Discharge date and time: 2023    Admitting Physician: Abner Turner MD     Discharge Physician: Dr Valentin Goldberg MD    Admission Diagnoses: Cocaine abuse ([facility]) [F14.10]  Bipolar 1 disorder ([address] [facility]) [F31.9]    Admission Condition: poor    Discharged Condition: stable    Admission Circumstance: The patient is a 35 y.o. male single live with a friend unemployed since March with significant past history of addiction and depression     ER report:      Vladimir Mcdermott is a 35 y.o. male who presents to the emergency department with auditory and visual hallucinations of  father and brother. Hearing voices and seeing shadows. Denies suicidal or homicidal ideation  Reports paranoia and panic attacks. Went to [facility] for an assessment today with complaints of hallucinations and delusions. He hears his  dad and brother telling him he will see him soon. He is terrified something will happen to him and he will die. His brother   had a traumatic brain injury from assault at 23 and  at 36years old. Father passed away in . Patient has had auditory hallucinations from his father and brother in the past. In the past few months also seeing two shadows over his son's crib. Reports mood instability. \"My mood can go to unstable in a minute\"  Reports mood swings of panic and depressed/crying, no history of violence. History of being on a number of mental health medications for PTSD and GONZÁLEZ.  \"I also self medicated for a long time\"            During interview:     Pt report that his thoughts were all the place and scattered  Has been emotional with mood swings  Has been hearing dad and brother (both passed away) telling him that his time is up and he should be okay  Pt was visually hallucinating of 2 shadows- of his dad and brother  Very paranoid that something is going to happen to him and he is constantly hypervigilant  Stressor- son and his mom are at sober house, patient live with his friend - not in safe environment, lost all his credentials - birth certificate and other documents  Pt has been having racing thoughts with mood swings  Impulsive - \"very very impulsive- say something which he does not mean, then enter into drinking binge and drugs\"  Pt denies using cocaine but admit to using MJ  Has been using alcohol periodically        The patient is not currently receiving care for the above psychiatric illness.       PAST MEDICAL/PSYCHIATRIC HISTORY:   Past Medical History:   Diagnosis Date    Drug abuse ([address])     Hyperlipidemia     Psychiatric problem        FAMILY/SOCIAL HISTORY:  Family History   Family history unknown: Yes     Social History     Socioeconomic History    Marital status: Single     Spouse name: Not on file    Number of children: 0    Years of education: 12    Highest education level: Not on file   Occupational History    Not on file   Tobacco Use    Smoking status: Every Day     Packs/day: 0.50     Types: Cigarettes    Smokeless tobacco: Never   Vaping Use    Vaping Use: Every day    Substances: Nicotine, THC    Devices: Disposable, Pre-filled or refillable cartridge   Substance and Sexual Activity    Alcohol use: Yes     Comment: occasionally    Drug use: Not Currently     Types: Marijuana Vince Inch), Opiates     Sexual activity: Yes     Partners: Female   Other Topics Concern    Not on file   Social History Narrative    Not on file     Social Determinants of Health     Financial Resource Strain: Not on file   Food Insecurity: Not on file   Transportation Needs: Not on file   Physical Activity: Not on file   Stress: Not on file   Social Connections: Not on file   Intimate Partner Violence: Not on file   Housing Stability: Not on file       MEDICATIONS:    Current Facility-Administered Medications:     paliperidone (INVEGA) extended release tablet 6 mg, 6 mg, Oral, Daily,

## 2023-08-26 NOTE — CARE COORDINATION
Family/Support Name: El Pedro  Contact #: [id number]   Relationship to Patient:     Placed call to above per Dr. Shaye Evans due to pt. D/c today 08/26/23    Response: no response. Left VM stating pt will be discharged today and to phone [facility] back.

## 2023-08-26 NOTE — DISCHARGE INSTRUCTIONS
Keep all follow up appointments, take medications as ordered, utilize positive supports, abstain from use of alcohol and drugs. If symptoms return or you feel at risk to yourself or others, please call 911, return the nearest emergency room, or call your local crisis hotline:  [facility]: [phone]: [phone]: [phone]     Due to the [address] [place] [address] Smoking Cessation Group is not currently available. For assistance with quitting smoking please go to https://smokefree.gov. A prescription for an FDA-approved tobacco cessation medication was offered at discharge and the patient refused.

## 2023-08-26 NOTE — GROUP NOTE
Group Therapy Note    Date: 8/25/2023    Group Start Time: 2100  Group End Time: 2130  Group Topic: Wrap-Up    [facility]    Janeth Gomes        Group Therapy Note    Attendees: 18/22       Patient's Goal:  to write everything out    Notes:  pt attentive in group    Status After Intervention:  Unchanged    Participation Level:  Active Listener    Participation Quality: Appropriate, Attentive, Sharing, and Supportive      Speech:  normal      Thought Process/Content: Logical      Affective Functioning: Congruent      Mood: euphoric      Level of consciousness:  Alert, Oriented x4, and Attentive      Response to Learning: Able to verbalize current knowledge/experience, Able to verbalize/acknowledge new learning, Able to retain information, Capable of insight, Able to change behavior, and Progressing to goal      Endings: None Reported    Modes of Intervention: Support      Discipline Responsible: [address]      Signature:  Javier Eaton

## 2023-08-26 NOTE — PROGRESS NOTES
Pt. refused to attend the 1000 skills group, despite staff encouragement.   Electronically signed by Iam Hein on 8/26/2023 at 12:22 PM

## 2023-08-26 NOTE — PROGRESS NOTES
Morning Community Meeting Topics    Kathi Schmid attended the morning community meeting on 8/26/23. Topics discussed today     [x] Introduction  Day of the week and date  Mask distribution  Current mask requirements  [x]Teams  Explanation of  Green and Blue team criteria  Nurses assigned to each team for today  Explanation about green and blue paper  Date  Patient's Name  Patient's Nurse  Goals  [x] Visitation  Announce the visiting hours for the day  Announce which team is allowed to have visitors for the day  Review any updated Covid 19 requirements for visitors during visitation  Vaccine Card or negative Covid test within 48 hours of visit  State Identification  Patients are reminded to alert the  at least 1 hour before visitation   [x] Unit Orientation  Coffee use  Phone location and etiquette  Shower locations  Howard and dryer location and process  Common area expectations  Staff rounds expectation  [x] Meals   Educate patient to the menu  The patient is encouraged to fill out the menu to get preferences at mealtime  The patient is educated that if they do not fill out the menu, they will get the standard tray  The coffee pot is decaf, patient encouraged to order regular coffee from menu.   Educate patient to the meal process  Patient encouraged to eat snacks provided twice daily  Snacks may stay in patient room     [x] Discharge Process  Discharge expectations  Fill out the survey after discharge   [x] Hygiene  Daily showers encouraged  Showers availability discussed   Daily dressing encouraged  Discussed wearing street clothing  Education provided on where to place linens and clothing  Linens in the hamper  personal clothing does not go into the linen hamper  [x] Group   Patient encouraged to attend group provided  Time of Group Meetings discussed  Gentle reminder that attendance is a Physician order  [x] Movement  Chair exercises completed  Stretching completed  Notes:  GOAL : \" to be easy

## 2023-12-10 ENCOUNTER — HOSPITAL ENCOUNTER (EMERGENCY)
Facility: HOSPITAL | Age: 33
Discharge: HOME | End: 2023-12-10
Attending: EMERGENCY MEDICINE
Payer: COMMERCIAL

## 2023-12-10 VITALS
DIASTOLIC BLOOD PRESSURE: 104 MMHG | HEIGHT: 67 IN | BODY MASS INDEX: 30.76 KG/M2 | TEMPERATURE: 97.7 F | HEART RATE: 104 BPM | OXYGEN SATURATION: 98 % | WEIGHT: 196 LBS | RESPIRATION RATE: 16 BRPM | SYSTOLIC BLOOD PRESSURE: 145 MMHG

## 2023-12-10 DIAGNOSIS — F29 PSYCHOSIS, UNSPECIFIED PSYCHOSIS TYPE (MULTI): Primary | ICD-10-CM

## 2023-12-10 LAB
ALBUMIN SERPL BCP-MCNC: 3.7 G/DL (ref 3.4–5)
ALP SERPL-CCNC: 71 U/L (ref 33–120)
ALT SERPL W P-5'-P-CCNC: 20 U/L (ref 10–52)
AMPHETAMINES UR QL SCN: NORMAL
ANION GAP SERPL CALC-SCNC: 12 MMOL/L (ref 10–20)
APAP SERPL-MCNC: <10 UG/ML
APPEARANCE UR: ABNORMAL
AST SERPL W P-5'-P-CCNC: 17 U/L (ref 9–39)
BARBITURATES UR QL SCN: NORMAL
BASOPHILS # BLD AUTO: 0.04 X10*3/UL (ref 0–0.1)
BASOPHILS NFR BLD AUTO: 0.6 %
BENZODIAZ UR QL SCN: NORMAL
BILIRUB SERPL-MCNC: 0.4 MG/DL (ref 0–1.2)
BILIRUB UR STRIP.AUTO-MCNC: NEGATIVE MG/DL
BUN SERPL-MCNC: 11 MG/DL (ref 6–23)
BZE UR QL SCN: NORMAL
CALCIUM SERPL-MCNC: 8.4 MG/DL (ref 8.6–10.3)
CANNABINOIDS UR QL SCN: NORMAL
CHLORIDE SERPL-SCNC: 108 MMOL/L (ref 98–107)
CO2 SERPL-SCNC: 23 MMOL/L (ref 21–32)
COLOR UR: YELLOW
CREAT SERPL-MCNC: 1.18 MG/DL (ref 0.5–1.3)
EOSINOPHIL # BLD AUTO: 0.13 X10*3/UL (ref 0–0.7)
EOSINOPHIL NFR BLD AUTO: 2.1 %
ERYTHROCYTE [DISTWIDTH] IN BLOOD BY AUTOMATED COUNT: 12.3 % (ref 11.5–14.5)
ETHANOL SERPL-MCNC: <10 MG/DL
FENTANYL+NORFENTANYL UR QL SCN: NORMAL
GFR SERPL CREATININE-BSD FRML MDRD: 84 ML/MIN/1.73M*2
GLUCOSE SERPL-MCNC: 114 MG/DL (ref 74–99)
GLUCOSE UR STRIP.AUTO-MCNC: NEGATIVE MG/DL
HCT VFR BLD AUTO: 44.2 % (ref 41–52)
HGB BLD-MCNC: 15 G/DL (ref 13.5–17.5)
HOLD SPECIMEN: NORMAL
IMM GRANULOCYTES # BLD AUTO: 0.02 X10*3/UL (ref 0–0.7)
IMM GRANULOCYTES NFR BLD AUTO: 0.3 % (ref 0–0.9)
KETONES UR STRIP.AUTO-MCNC: NEGATIVE MG/DL
LEUKOCYTE ESTERASE UR QL STRIP.AUTO: NEGATIVE
LYMPHOCYTES # BLD AUTO: 2.02 X10*3/UL (ref 1.2–4.8)
LYMPHOCYTES NFR BLD AUTO: 32.5 %
MCH RBC QN AUTO: 31.1 PG (ref 26–34)
MCHC RBC AUTO-ENTMCNC: 33.9 G/DL (ref 32–36)
MCV RBC AUTO: 92 FL (ref 80–100)
MONOCYTES # BLD AUTO: 0.54 X10*3/UL (ref 0.1–1)
MONOCYTES NFR BLD AUTO: 8.7 %
NEUTROPHILS # BLD AUTO: 3.46 X10*3/UL (ref 1.2–7.7)
NEUTROPHILS NFR BLD AUTO: 55.8 %
NITRITE UR QL STRIP.AUTO: NEGATIVE
NRBC BLD-RTO: 0 /100 WBCS (ref 0–0)
OPIATES UR QL SCN: NORMAL
OXYCODONE+OXYMORPHONE UR QL SCN: NORMAL
PCP UR QL SCN: NORMAL
PH UR STRIP.AUTO: 5 [PH]
PLATELET # BLD AUTO: 242 X10*3/UL (ref 150–450)
POTASSIUM SERPL-SCNC: 3.8 MMOL/L (ref 3.5–5.3)
PROT SERPL-MCNC: 5.6 G/DL (ref 6.4–8.2)
PROT UR STRIP.AUTO-MCNC: NEGATIVE MG/DL
RBC # BLD AUTO: 4.83 X10*6/UL (ref 4.5–5.9)
RBC # UR STRIP.AUTO: NEGATIVE /UL
SALICYLATES SERPL-MCNC: <3 MG/DL
SARS-COV-2 RNA RESP QL NAA+PROBE: NOT DETECTED
SODIUM SERPL-SCNC: 139 MMOL/L (ref 136–145)
SP GR UR STRIP.AUTO: 1.02
UROBILINOGEN UR STRIP.AUTO-MCNC: <2 MG/DL
WBC # BLD AUTO: 6.2 X10*3/UL (ref 4.4–11.3)

## 2023-12-10 PROCEDURE — 80320 DRUG SCREEN QUANTALCOHOLS: CPT | Performed by: NURSE PRACTITIONER

## 2023-12-10 PROCEDURE — 81003 URINALYSIS AUTO W/O SCOPE: CPT | Mod: 59 | Performed by: NURSE PRACTITIONER

## 2023-12-10 PROCEDURE — 80307 DRUG TEST PRSMV CHEM ANLYZR: CPT | Performed by: NURSE PRACTITIONER

## 2023-12-10 PROCEDURE — 36415 COLL VENOUS BLD VENIPUNCTURE: CPT | Performed by: NURSE PRACTITIONER

## 2023-12-10 PROCEDURE — 85025 COMPLETE CBC W/AUTO DIFF WBC: CPT | Performed by: NURSE PRACTITIONER

## 2023-12-10 PROCEDURE — 2500000004 HC RX 250 GENERAL PHARMACY W/ HCPCS (ALT 636 FOR OP/ED): Performed by: NURSE PRACTITIONER

## 2023-12-10 PROCEDURE — 80053 COMPREHEN METABOLIC PANEL: CPT | Performed by: NURSE PRACTITIONER

## 2023-12-10 PROCEDURE — 80179 DRUG ASSAY SALICYLATE: CPT | Performed by: NURSE PRACTITIONER

## 2023-12-10 PROCEDURE — 87635 SARS-COV-2 COVID-19 AMP PRB: CPT | Performed by: NURSE PRACTITIONER

## 2023-12-10 PROCEDURE — 99285 EMERGENCY DEPT VISIT HI MDM: CPT | Performed by: EMERGENCY MEDICINE

## 2023-12-10 RX ORDER — OLANZAPINE 5 MG/1
5 TABLET, ORALLY DISINTEGRATING ORAL ONCE
Status: COMPLETED | OUTPATIENT
Start: 2023-12-10 | End: 2023-12-10

## 2023-12-10 RX ORDER — OLANZAPINE 10 MG/2ML
5 INJECTION, POWDER, FOR SOLUTION INTRAMUSCULAR ONCE
Status: COMPLETED | OUTPATIENT
Start: 2023-12-10 | End: 2023-12-10

## 2023-12-10 RX ORDER — OLANZAPINE 5 MG/1
5 TABLET ORAL ONCE
Status: COMPLETED | OUTPATIENT
Start: 2023-12-10 | End: 2023-12-10

## 2023-12-10 RX ADMIN — OLANZAPINE 5 MG: 5 TABLET, FILM COATED ORAL at 14:57

## 2023-12-10 SDOH — HEALTH STABILITY: MENTAL HEALTH: ANXIETY SYMPTOMS: GENERALIZED

## 2023-12-10 SDOH — HEALTH STABILITY: MENTAL HEALTH: IN THE PAST FEW WEEKS, HAVE YOU WISHED YOU WERE DEAD?: NO

## 2023-12-10 SDOH — HEALTH STABILITY: MENTAL HEALTH: HALLUCINATION: AUDITORY

## 2023-12-10 SDOH — HEALTH STABILITY: MENTAL HEALTH: NON-SPECIFIC ACTIVE SUICIDAL THOUGHTS (PAST 1 MONTH): NO

## 2023-12-10 SDOH — HEALTH STABILITY: MENTAL HEALTH: DEPRESSION SYMPTOMS: NO PROBLEMS REPORTED OR OBSERVED.

## 2023-12-10 SDOH — HEALTH STABILITY: MENTAL HEALTH: SUICIDAL BEHAVIOR (LIFETIME): NO

## 2023-12-10 SDOH — HEALTH STABILITY: MENTAL HEALTH: HAVE YOU EVER TRIED TO KILL YOURSELF?: NO

## 2023-12-10 SDOH — ECONOMIC STABILITY: HOUSING INSECURITY: FEELS SAFE LIVING IN HOME: YES

## 2023-12-10 SDOH — HEALTH STABILITY: MENTAL HEALTH: BEHAVIORS/MOOD: CALM;FLAT AFFECT;COOPERATIVE

## 2023-12-10 SDOH — HEALTH STABILITY: MENTAL HEALTH: IN THE PAST WEEK, HAVE YOU BEEN HAVING THOUGHTS ABOUT KILLING YOURSELF?: NO

## 2023-12-10 SDOH — HEALTH STABILITY: MENTAL HEALTH: WISH TO BE DEAD (PAST 1 MONTH): NO

## 2023-12-10 SDOH — HEALTH STABILITY: MENTAL HEALTH: ARE YOU HAVING THOUGHTS OF KILLING YOURSELF RIGHT NOW?: NO

## 2023-12-10 SDOH — HEALTH STABILITY: MENTAL HEALTH: IN THE PAST FEW WEEKS, HAVE YOU FELT THAT YOU OR YOUR FAMILY WOULD BE BETTER OFF IF YOU WERE DEAD?: NO

## 2023-12-10 ASSESSMENT — LIFESTYLE VARIABLES
PRESCIPTION_ABUSE_PAST_12_MONTHS: NO
SUBSTANCE_ABUSE_PAST_12_MONTHS: YES
EVER HAD A DRINK FIRST THING IN THE MORNING TO STEADY YOUR NERVES TO GET RID OF A HANGOVER: NO
EVER FELT BAD OR GUILTY ABOUT YOUR DRINKING: NO
REASON UNABLE TO ASSESS: NO
HAVE YOU EVER FELT YOU SHOULD CUT DOWN ON YOUR DRINKING: NO
HAVE PEOPLE ANNOYED YOU BY CRITICIZING YOUR DRINKING: NO

## 2023-12-10 ASSESSMENT — COLUMBIA-SUICIDE SEVERITY RATING SCALE - C-SSRS
1. IN THE PAST MONTH, HAVE YOU WISHED YOU WERE DEAD OR WISHED YOU COULD GO TO SLEEP AND NOT WAKE UP?: NO
2. HAVE YOU ACTUALLY HAD ANY THOUGHTS OF KILLING YOURSELF?: NO
6. HAVE YOU EVER DONE ANYTHING, STARTED TO DO ANYTHING, OR PREPARED TO DO ANYTHING TO END YOUR LIFE?: NO

## 2023-12-10 ASSESSMENT — PAIN SCALES - GENERAL
PAINLEVEL_OUTOF10: 0 - NO PAIN
PAINLEVEL_OUTOF10: 0 - NO PAIN

## 2023-12-10 ASSESSMENT — PAIN - FUNCTIONAL ASSESSMENT
PAIN_FUNCTIONAL_ASSESSMENT: 0-10
PAIN_FUNCTIONAL_ASSESSMENT: 0-10

## 2023-12-10 NOTE — ED PROVIDER NOTES
HPI   Chief Complaint   Patient presents with    Psychiatric Evaluation     Pt with psych history, on medication hearing auditory stimuli        Patient is a 33-year-old male who presents ED today for a psychiatric complaint.  Patient denies any suicidal ideations or homicidal ideations.  Patient denies any visual hallucinations.  Patient does endorse audio hallucinations of his brother and dad who tell him something bad is going to happen to him.  Patient frequently has these voices but the amount has increased significantly over the past week and he does not think he can take care of himself.  Patient has history of: Schizophrenia  Patient is supposed to take, zyprexa and paliperidone for this, patient states compliance with these medications  Patient states that there was no relationship/financial/family issue that seem to start this episode.  Patient endorses tobacco and infrequent marijuana use, denies EtOH or other drug use.  Patient does have a counselor/psychiatrist/psychologist that they see at [facility] for their mental health care.                            Stephanie Coma Scale Score: 15                  Patient History   No past medical history on file.  No past surgical history on file.  No family history on file.  Social History     Tobacco Use    Smoking status: Not on file    Smokeless tobacco: Not on file   Substance Use Topics    Alcohol use: Not on file    Drug use: Not on file       Physical Exam   ED Triage Vitals [12/10/23 1321]   Temp Heart Rate Resp BP   36.5 °C (97.7 °F) 104 16 (!) 145/104      SpO2 Temp Source Heart Rate Source Patient Position   98 % Tympanic Monitor --      BP Location FiO2 (%)     -- --       Physical Exam  Vitals and nursing note reviewed.   Constitutional:       General: He is not in acute distress.     Appearance: He is well-developed.   HENT:      Head: Normocephalic and atraumatic.   Eyes:      Conjunctiva/sclera: Conjunctivae normal.   Cardiovascular:      Rate  and Rhythm: Normal rate and regular rhythm.      Heart sounds: No murmur heard.  Pulmonary:      Effort: Pulmonary effort is normal. No respiratory distress.      Breath sounds: Normal breath sounds.   Abdominal:      Palpations: Abdomen is soft.      Tenderness: There is no abdominal tenderness.   Musculoskeletal:         General: No swelling.      Cervical back: Neck supple.   Skin:     General: Skin is warm and dry.      Capillary Refill: Capillary refill takes less than 2 seconds.   Neurological:      Mental Status: He is alert.   Psychiatric:         Mood and Affect: Mood normal.         ED Course & MDM   ED Course as of 12/10/23 1611   Sun Dec 10, 2023   1533 Drug Screen, Urine  Negative [WS]   1533 CBC and Auto Differential  CBC stable, no leukocytosis, anemia, or thrombocytopenia [WS]   1533 Urinalysis with Reflex Microscopic and Culture(!)  No evidence of UTI [WS]   1533 Acute Toxicology Panel, Blood  No coingestions [WS]   1533 Comprehensive Metabolic Panel(!)  CMP is stable, no electrolyte abnormalities, metabolic disorder, kidney injury, or elevated liver enzymes consistent with liver pathology. [WS]   1553 Coronavirus 2019, Screen Asymptomatic  Negative [WS]      ED Course User Index  [WS] Moises Anderson, APRN-CNP         Diagnoses as of 12/10/23 1611   Psychosis, unspecified psychosis type (CMS/HCC)       Medical Decision Making  Patient's symptoms are concerning for worsening of patient's psychosis.  Given patient's symptoms, will obtain basic work-up for possible medical causes for these symptoms.  We will have patient speak with the EPAT for recommendations.  A dose of patient's oral Zyprexa was ordered    MEDICAL CLEARANCE FOR PSYCHIATRIC EVALUATION:  No clinical evidence of drug-induced psychosis, meningitis, encephalitis, sepsis, thyroid disease, hypoglycemia, withdrawal syndrome, hypoxemia, electrolyte disturbance, CVA/TIA, seizures, or traumatic brain injury.  Patient is medically cleared for  evaluation by BIANCA    Spoke with BIANCA who states that patient does not endorse any suicidal or homicidal ideations, his auditory hallucinations are baseline for him although was slightly increased he does have an appointment with his psychiatrist tomorrow.  Patient's mother states she will make sure he keeps that appointment.  Patient does have social structures to assist him in receiving the care he needs.  I do believe he is appropriate for discharge as well.    I discussed the differential, results and discharge plan with the patient.  I emphasized the importance of follow-up with the physician I referred them to in the timeframe recommended.  I explained reasons for the them to return to the Emergency Department. Additional verbal discharge instructions were also given and discussed with them to supplement those generated by the EMR. We also discussed medications that were prescribed (if any) including common side effects and interactions. All questions were addressed.  They understand return precautions and discharge instructions. They expressed understanding.          Risk  Prescription drug management.        Procedure  Procedures           DREW Rowe  12/10/23 1553       DREW Rowe  12/10/23 1610
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
## 2024-01-26 ENCOUNTER — HOSPITAL ENCOUNTER (OUTPATIENT)
Dept: GENERAL RADIOLOGY | Age: 34
End: 2024-01-26
Payer: COMMERCIAL

## 2024-01-26 DIAGNOSIS — R76.12 NONSPECIFIC REACTION TO CELL MEDIATED IMMUNITY MEASUREMENT OF GAMMA INTERFERON ANTIGEN RESPONSE WITHOUT ACTIVE TUBERCULOSIS: ICD-10-CM

## 2024-01-26 PROCEDURE — 71046 X-RAY EXAM CHEST 2 VIEWS: CPT

## 2024-05-26 ENCOUNTER — HOSPITAL ENCOUNTER (EMERGENCY)
Age: 34
Discharge: HOME | End: 2024-05-26
Attending: STUDENT IN AN ORGANIZED HEALTH CARE EDUCATION/TRAINING PROGRAM
Payer: COMMERCIAL

## 2024-05-26 VITALS
HEIGHT: 67 IN | DIASTOLIC BLOOD PRESSURE: 68 MMHG | TEMPERATURE: 98.2 F | SYSTOLIC BLOOD PRESSURE: 125 MMHG | WEIGHT: 178 LBS | HEART RATE: 76 BPM | BODY MASS INDEX: 27.94 KG/M2 | OXYGEN SATURATION: 97 % | RESPIRATION RATE: 17 BRPM

## 2024-05-26 DIAGNOSIS — H91.93 HEARING DIFFICULTY OF BOTH EARS: Primary | ICD-10-CM

## 2024-05-26 DIAGNOSIS — R21 RASH AND OTHER NONSPECIFIC SKIN ERUPTION: ICD-10-CM

## 2024-05-26 PROCEDURE — 99283 EMERGENCY DEPT VISIT LOW MDM: CPT

## 2024-05-26 RX ORDER — CLOTRIMAZOLE AND BETAMETHASONE DIPROPIONATE 10; .64 MG/G; MG/G
CREAM TOPICAL
Qty: 15 G | Refills: 0 | Status: SHIPPED | OUTPATIENT
Start: 2024-05-26

## 2024-05-26 RX ORDER — GUAIFENESIN 600 MG/1
600 TABLET, EXTENDED RELEASE ORAL 2 TIMES DAILY
Qty: 30 TABLET | Refills: 0 | Status: SHIPPED | OUTPATIENT
Start: 2024-05-26 | End: 2024-06-10

## 2024-05-26 ASSESSMENT — LIFESTYLE VARIABLES
HOW MANY STANDARD DRINKS CONTAINING ALCOHOL DO YOU HAVE ON A TYPICAL DAY: PATIENT DOES NOT DRINK
HOW OFTEN DO YOU HAVE A DRINK CONTAINING ALCOHOL: NEVER

## 2024-05-26 ASSESSMENT — PAIN - FUNCTIONAL ASSESSMENT: PAIN_FUNCTIONAL_ASSESSMENT: NONE - DENIES PAIN

## 2024-05-27 NOTE — ED PROVIDER NOTES
[facility] ED  eMERGENCY dEPARTMENT eNCOUnter      Pt Name: Tony Jones  MRN: [id number]  Birthdate 1990  Date of evaluation: 5/26/2024  Provider: Erik Rausch MD  Note Started: 5/26/24 9:08 PM EDT    HISTORY OF PRESENT ILLNESS      Chief Complaint   Patient presents with    Hearing Problem     Six days ago started to have troubles hearing out of right ear, and now is having troubles hearing in the left. Pt denies any trauma or injury to the area. Pt denies being in any pain.       The history is provided by the Patient.  Tony Jones is a 34 y.o. male with a PMH clinically significant for Anxiety, MDD, PTSD, Schizoaffective D/o, Bipolar I D/o, Tobacco Smoking, Etoh Abuse, THC abuse, and Polysubstance abuse presenting to the ED via PV c/o multiple complaints including bilateral hearing loss ongoing for the past 6 days in addition to rash noted in the left hand and bilateral feet between the webbing's of the fingers and feet.  Patient stating hearing loss gradual and started on the right, gradually progressing to the left.  States he just feels muffled.  Is still able to hear.  States no associated pain or drainage from the ears.  No recent swimming.  Denies any new medications.  Does not feel particularly congested either.  No other recent illnesses.  States that the rash to his hands and feet is a little bit itchy, dry and red.  Denies any associated: F/C/D, HA, Lightheadedness, Dizziness, Eye pain, Cough, SOB, CP, Abd pain, N/V/D/C, Dysuria, Hematuria, or Difficulty urinating.  Precipitating Factors: None.   Alleviating Factors: Nothing, nothing yet tried for relief PTA.   States they have otherwise been feeling well.    Per Chart Review: PMH as noted above obtained via outpatient chart review.       REVIEW OF SYSTEMS       Review of Systems  Otherwise as noted in HPI    PAST MEDICAL HISTORY     Past Medical History:   Diagnosis Date    Drug abuse (HCC)     Hyperlipidemia     Psychiatric

## 2024-06-28 ENCOUNTER — HOSPITAL ENCOUNTER (INPATIENT)
Age: 34
LOS: 4 days | Discharge: HOME | DRG: 885 | End: 2024-07-03
Attending: PSYCHIATRY & NEUROLOGY | Admitting: PSYCHIATRY & NEUROLOGY
Payer: COMMERCIAL

## 2024-06-28 DIAGNOSIS — F31.9 BIPOLAR 1 DISORDER (HCC): Primary | ICD-10-CM

## 2024-06-28 PROCEDURE — 80179 DRUG ASSAY SALICYLATE: CPT

## 2024-06-28 PROCEDURE — 82077 ASSAY SPEC XCP UR&BREATH IA: CPT

## 2024-06-28 PROCEDURE — 36415 COLL VENOUS BLD VENIPUNCTURE: CPT

## 2024-06-28 PROCEDURE — 80053 COMPREHEN METABOLIC PANEL: CPT

## 2024-06-28 PROCEDURE — 85025 COMPLETE CBC W/AUTO DIFF WBC: CPT

## 2024-06-28 PROCEDURE — 99285 EMERGENCY DEPT VISIT HI MDM: CPT

## 2024-06-28 PROCEDURE — 82550 ASSAY OF CK (CPK): CPT

## 2024-06-28 PROCEDURE — 80143 DRUG ASSAY ACETAMINOPHEN: CPT

## 2024-06-28 PROCEDURE — 80061 LIPID PANEL: CPT

## 2024-06-28 PROCEDURE — 81003 URINALYSIS AUTO W/O SCOPE: CPT

## 2024-06-28 PROCEDURE — 83036 HEMOGLOBIN GLYCOSYLATED A1C: CPT

## 2024-06-28 PROCEDURE — 80307 DRUG TEST PRSMV CHEM ANLYZR: CPT

## 2024-06-28 PROCEDURE — 80164 ASSAY DIPROPYLACETIC ACD TOT: CPT

## 2024-06-28 PROCEDURE — 84443 ASSAY THYROID STIM HORMONE: CPT

## 2024-06-28 ASSESSMENT — PAIN - FUNCTIONAL ASSESSMENT: PAIN_FUNCTIONAL_ASSESSMENT: NONE - DENIES PAIN

## 2024-06-29 PROBLEM — F31.9 BIPOLAR 1 DISORDER (HCC): Status: ACTIVE | Noted: 2024-06-29

## 2024-06-29 LAB
ALBUMIN SERPL-MCNC: 4.5 G/DL (ref 3.5–4.6)
ALP SERPL-CCNC: 97 U/L (ref 35–104)
ALT SERPL-CCNC: 12 U/L (ref 0–41)
AMPHET UR QL SCN: ABNORMAL
ANION GAP SERPL CALCULATED.3IONS-SCNC: 12 MEQ/L (ref 9–15)
APAP SERPL-MCNC: <5 UG/ML (ref 10–30)
AST SERPL-CCNC: 15 U/L (ref 0–40)
BARBITURATES UR QL SCN: ABNORMAL
BASOPHILS # BLD: 0.1 K/UL (ref 0–0.2)
BASOPHILS NFR BLD: 0.9 %
BENZODIAZ UR QL SCN: ABNORMAL
BILIRUB SERPL-MCNC: <0.2 MG/DL (ref 0.2–0.7)
BILIRUB UR QL STRIP: NEGATIVE
BUN SERPL-MCNC: 7 MG/DL (ref 6–20)
CALCIUM SERPL-MCNC: 8.7 MG/DL (ref 8.5–9.9)
CANNABINOIDS UR QL SCN: POSITIVE
CHLORIDE SERPL-SCNC: 102 MEQ/L (ref 95–107)
CHOLEST SERPL-MCNC: 167 MG/DL (ref 0–199)
CK SERPL-CCNC: 86 U/L (ref 0–190)
CLARITY UR: CLEAR
CO2 SERPL-SCNC: 23 MEQ/L (ref 20–31)
COCAINE UR QL SCN: ABNORMAL
COLOR UR: YELLOW
CREAT SERPL-MCNC: 0.97 MG/DL (ref 0.7–1.2)
DRUG SCREEN COMMENT UR-IMP: ABNORMAL
EOSINOPHIL # BLD: 0.1 K/UL (ref 0–0.7)
EOSINOPHIL NFR BLD: 1.8 %
ERYTHROCYTE [DISTWIDTH] IN BLOOD BY AUTOMATED COUNT: 13.2 % (ref 11.5–14.5)
ESTIMATED AVERAGE GLUCOSE: 108 MG/DL
ETHANOL PERCENT: NORMAL G/DL
ETHANOLAMINE SERPL-MCNC: <10 MG/DL (ref 0–0.08)
FENTANYL SCREEN, URINE: ABNORMAL
GLOBULIN SER CALC-MCNC: 2.5 G/DL (ref 2.3–3.5)
GLUCOSE SERPL-MCNC: 98 MG/DL (ref 70–99)
GLUCOSE UR STRIP-MCNC: NEGATIVE MG/DL
HBA1C MFR BLD: 5.4 % (ref 4–6)
HCT VFR BLD AUTO: 40.7 % (ref 42–52)
HDLC SERPL-MCNC: 35 MG/DL (ref 40–59)
HGB BLD-MCNC: 13.7 G/DL (ref 14–18)
HGB UR QL STRIP: NEGATIVE
KETONES UR STRIP-MCNC: ABNORMAL MG/DL
LDLC SERPL CALC-MCNC: 106 MG/DL (ref 0–129)
LEUKOCYTE ESTERASE UR QL STRIP: NEGATIVE
LYMPHOCYTES # BLD: 2.6 K/UL (ref 1–4.8)
LYMPHOCYTES NFR BLD: 47.4 %
MCH RBC QN AUTO: 30 PG (ref 27–31.3)
MCHC RBC AUTO-ENTMCNC: 33.7 % (ref 33–37)
MCV RBC AUTO: 89.1 FL (ref 79–92.2)
METHADONE UR QL SCN: ABNORMAL
MONOCYTES # BLD: 0.5 K/UL (ref 0.2–0.8)
MONOCYTES NFR BLD: 8.8 %
NEUTROPHILS # BLD: 2.2 K/UL (ref 1.4–6.5)
NEUTS SEG NFR BLD: 40.7 %
NITRITE UR QL STRIP: NEGATIVE
OPIATES UR QL SCN: ABNORMAL
OXYCODONE UR QL SCN: ABNORMAL
PCP UR QL SCN: ABNORMAL
PH UR STRIP: 6 [PH] (ref 5–9)
PLATELET # BLD AUTO: 182 K/UL (ref 130–400)
POTASSIUM SERPL-SCNC: 3.5 MEQ/L (ref 3.4–4.9)
PROPOXYPH UR QL SCN: ABNORMAL
PROT SERPL-MCNC: 7 G/DL (ref 6.3–8)
PROT UR STRIP-MCNC: NEGATIVE MG/DL
RBC # BLD AUTO: 4.57 M/UL (ref 4.7–6.1)
SALICYLATES SERPL-MCNC: <0.3 MG/DL (ref 15–30)
SODIUM SERPL-SCNC: 137 MEQ/L (ref 135–144)
SP GR UR STRIP: 1.02 (ref 1–1.03)
TRIGL SERPL-MCNC: 128 MG/DL (ref 0–150)
TSH SERPL-MCNC: 2.7 UIU/ML (ref 0.44–3.86)
URINE REFLEX TO CULTURE: ABNORMAL
UROBILINOGEN UR STRIP-ACNC: 1 E.U./DL
VALPROATE SERPL-MCNC: <2.8 UG/ML (ref 50–100)
WBC # BLD AUTO: 5.5 K/UL (ref 4.8–10.8)

## 2024-06-29 PROCEDURE — 6360000002 HC RX W HCPCS: Performed by: PSYCHIATRY & NEUROLOGY

## 2024-06-29 PROCEDURE — 6370000000 HC RX 637 (ALT 250 FOR IP): Performed by: PSYCHIATRY & NEUROLOGY

## 2024-06-29 PROCEDURE — 93005 ELECTROCARDIOGRAM TRACING: CPT | Performed by: PHYSICIAN ASSISTANT

## 2024-06-29 PROCEDURE — 1240000000 HC EMOTIONAL WELLNESS R&B

## 2024-06-29 RX ORDER — BUPRENORPHINE HYDROCHLORIDE AND NALOXONE HYDROCHLORIDE DIHYDRATE 8; 2 MG/1; MG/1
1 TABLET SUBLINGUAL 2 TIMES DAILY
Status: DISCONTINUED | OUTPATIENT
Start: 2024-06-29 | End: 2024-07-03 | Stop reason: HOSPADM

## 2024-06-29 RX ORDER — PALIPERIDONE 3 MG/1
3 TABLET, EXTENDED RELEASE ORAL DAILY
Status: DISCONTINUED | OUTPATIENT
Start: 2024-06-29 | End: 2024-06-29

## 2024-06-29 RX ORDER — MAGNESIUM HYDROXIDE/ALUMINUM HYDROXICE/SIMETHICONE 120; 1200; 1200 MG/30ML; MG/30ML; MG/30ML
30 SUSPENSION ORAL PRN
Status: DISCONTINUED | OUTPATIENT
Start: 2024-06-29 | End: 2024-07-03 | Stop reason: HOSPADM

## 2024-06-29 RX ORDER — HYDROXYZINE HYDROCHLORIDE 50 MG/ML
50 INJECTION, SOLUTION INTRAMUSCULAR EVERY 6 HOURS PRN
Status: DISCONTINUED | OUTPATIENT
Start: 2024-06-29 | End: 2024-07-03 | Stop reason: HOSPADM

## 2024-06-29 RX ORDER — HALOPERIDOL 5 MG/1
5 TABLET ORAL EVERY 6 HOURS PRN
Status: DISCONTINUED | OUTPATIENT
Start: 2024-06-29 | End: 2024-07-03 | Stop reason: HOSPADM

## 2024-06-29 RX ORDER — DIVALPROEX SODIUM 500 MG/1
500 TABLET, EXTENDED RELEASE ORAL ONCE
Status: COMPLETED | OUTPATIENT
Start: 2024-06-29 | End: 2024-06-29

## 2024-06-29 RX ORDER — PALIPERIDONE 6 MG/1
6 TABLET, EXTENDED RELEASE ORAL EVERY MORNING
Status: ON HOLD | COMMUNITY
End: 2024-06-29

## 2024-06-29 RX ORDER — HYDROXYZINE PAMOATE 50 MG/1
50 CAPSULE ORAL EVERY 6 HOURS PRN
Status: DISCONTINUED | OUTPATIENT
Start: 2024-06-29 | End: 2024-07-03 | Stop reason: HOSPADM

## 2024-06-29 RX ORDER — DIVALPROEX SODIUM 500 MG/1
500 TABLET, EXTENDED RELEASE ORAL DAILY
Status: DISCONTINUED | OUTPATIENT
Start: 2024-06-30 | End: 2024-07-03 | Stop reason: HOSPADM

## 2024-06-29 RX ORDER — HALOPERIDOL 5 MG/ML
5 INJECTION INTRAMUSCULAR EVERY 6 HOURS PRN
Status: DISCONTINUED | OUTPATIENT
Start: 2024-06-29 | End: 2024-07-03 | Stop reason: HOSPADM

## 2024-06-29 RX ORDER — BENZTROPINE MESYLATE 1 MG/ML
2 INJECTION INTRAMUSCULAR; INTRAVENOUS 2 TIMES DAILY PRN
Status: DISCONTINUED | OUTPATIENT
Start: 2024-06-29 | End: 2024-07-03 | Stop reason: HOSPADM

## 2024-06-29 RX ORDER — PALIPERIDONE 9 MG/1
9 TABLET, EXTENDED RELEASE ORAL EVERY MORNING
Status: ON HOLD | COMMUNITY
End: 2024-07-03 | Stop reason: HOSPADM

## 2024-06-29 RX ORDER — TRAZODONE HYDROCHLORIDE 50 MG/1
50 TABLET ORAL NIGHTLY PRN
Status: DISCONTINUED | OUTPATIENT
Start: 2024-06-29 | End: 2024-07-03 | Stop reason: HOSPADM

## 2024-06-29 RX ORDER — ACETAMINOPHEN 325 MG/1
650 TABLET ORAL EVERY 4 HOURS PRN
Status: DISCONTINUED | OUTPATIENT
Start: 2024-06-29 | End: 2024-07-03 | Stop reason: HOSPADM

## 2024-06-29 RX ORDER — PALIPERIDONE 6 MG/1
6 TABLET, EXTENDED RELEASE ORAL DAILY
Status: DISCONTINUED | OUTPATIENT
Start: 2024-06-30 | End: 2024-07-03 | Stop reason: HOSPADM

## 2024-06-29 RX ADMIN — DIVALPROEX SODIUM 500 MG: 500 TABLET, EXTENDED RELEASE ORAL at 10:03

## 2024-06-29 RX ADMIN — BUPRENORPHINE AND NALOXONE 1 TABLET: 8; 2 TABLET SUBLINGUAL at 13:23

## 2024-06-29 RX ADMIN — PALIPERIDONE 3 MG: 3 TABLET, EXTENDED RELEASE ORAL at 10:03

## 2024-06-29 ASSESSMENT — SLEEP AND FATIGUE QUESTIONNAIRES
DO YOU HAVE DIFFICULTY SLEEPING: YES
AVERAGE NUMBER OF SLEEP HOURS: 4
SLEEP PATTERN: DIFFICULTY FALLING ASLEEP
DO YOU USE A SLEEP AID: YES

## 2024-06-29 ASSESSMENT — PATIENT HEALTH QUESTIONNAIRE - PHQ9
1. LITTLE INTEREST OR PLEASURE IN DOING THINGS: SEVERAL DAYS
SUM OF ALL RESPONSES TO PHQ QUESTIONS 1-9: 2
SUM OF ALL RESPONSES TO PHQ9 QUESTIONS 1 & 2: 2
SUM OF ALL RESPONSES TO PHQ QUESTIONS 1-9: 2
2. FEELING DOWN, DEPRESSED OR HOPELESS: SEVERAL DAYS
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
## 2024-06-29 NOTE — ED NOTES
Discussed pt with Dr. Carlos.  Recommendation for pt to be admitted to 3W with standard PRNs, Depokote 500mg and Invega 3mg.
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
## 2024-06-29 NOTE — ED PROVIDER NOTES
[facility] ED  EMERGENCY DEPARTMENT ENCOUNTER      Pt Name: Tony Jones  MRN: [id number]  Birthdate 1990  Date of evaluation: 6/28/2024  Provider: Russel Scott PA-C  11:42 PM EDT    CHIEF COMPLAINT       Chief Complaint   Patient presents with    Psychiatric Evaluation     Pt states he is having auditory hallucinations. Denies having any SI or HI but states \"the voices are telling me to basically end it all. I'm not going to. I'm not a threat to myself or others, I just know I need to get help.\" Pt denies any visual hallucinations. Pt denies any drinking or drug use tonight. States he is on psych meds and has been taking them approprietly and not missing any doses.         HISTORY OF PRESENT ILLNESS   (Location/Symptom, Timing/Onset, Context/Setting, Quality, Duration, Modifying Factors, Severity)  Note limiting factors.   Tony Jones is a 34 y.o. male who presents to the emergency department patient states he is having increased auditory hallucinations time and all he knows he needs help.  He denies any injuries including cuts bruises denies any fever chills nausea vomiting.  He is having difficulty sleeping states he is more somnolent.  States he wants help.  Symptoms moderate severity.    HPI    Nursing Notes were reviewed.    REVIEW OF SYSTEMS    (2-9 systems for level 4, 10 or more for level 5)     Review of Systems   Constitutional:  Negative for activity change, appetite change and unexpected weight change.   HENT:  Negative for ear discharge, nosebleeds and voice change.    Eyes:  Negative for discharge.   Respiratory:  Negative for cough.    Cardiovascular:  Negative for chest pain.   Gastrointestinal:  Negative for abdominal distention, abdominal pain, nausea and vomiting.   Genitourinary:  Negative for dysuria and hematuria.   Musculoskeletal:  Negative for joint swelling.   Skin:  Negative for pallor.   Neurological:  Negative for seizures and facial asymmetry.

## 2024-06-29 NOTE — H&P
Dk Lo is a 67 year old male patient.  He declined the HBO on December 18 because of abdominal discomfort and constipation .  No fever. He has Colorado catheter for more than a year with good urine output.  No fever and no chest symptoms.  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)   • Hypercalcemia   • Renal cell carcinoma (CMS/HCC)   • Pressure injury of lower back, stage 3 (CMS/HCC)   • Neurogenic orthostatic hypotension (CMS/HCC)   • Non-healing non-surgical wound   • Metastatic renal cell carcinoma (CMS/HCC)   • Brain metastases (CMS/HCC)   • Right carotid artery occlusion   • Soft tissue radionecrosis   • 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]     Past Medical History:   Diagnosis Date   • Bilateral inguinal hernia with obstruction 9/2015   • Bladder tumor 10/12/2015    renal cell carcinoma   • Brain metastases (CMS/HCC) 12/16/2019   • Chronic kidney disease (CKD), stage III (moderate) (CMS/HCC) 04/11/2016   • Chronic kidney disease, stage II (mild) 09/24/2015    acute renal failure with hydronephrosis  Stage 2   • Chronic pain     left arm pain with movement   • Cocaine abuse (CMS/HCC)     none since 2015   • 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) 12/01/2018   • Constipation    • Constipation 12/2018   • COPD (chronic obstructive pulmonary disease) (CMS/HCC)     prn inhaler   • Coronary artery disease    • Decreased activities of daily living (ADL)     uses walker at [facility] [phone] ask for 1st floor, was homeless since 2015    • Difficult intubation    • Essential (primary) hypertension    • Fracture 1980    Right wrist  Casted   • Gastroesophageal reflux disease    • Hematuria 12/05/2018    bladder cancer, numerous TURBT procedures   • High cholesterol    • History of right ICA BG stroke 2002    R BG stroke 2002 due to ROME occlusion   • Hyperparathyroidism (CMS/HCC) 04/11/2016   • Hypertensive urgency 02/24/2016   • Impaired mobility and ADLs     [facility] patient DNR [facility], resident of [facility] in [place] (floor 1) [phone]   • Incarcerated hernia 09/17/2015   • Malignant neoplasm (CMS/HCC) 10/20/15    bladder cancer   • Metastatic cancer (CMS/HCC) 12/01/2018    thoracic spine, lungs, cellebellum, bone- sees Dr. Reynoso   • Open wound of tissue overlying spine 09/2019    small open area on spinal incision not healing probably due to bone protusion post 12/2018 surgery   • Right carotid artery occlusion 2002    aumarosis short period then sudden stroke, Tx [facility] Plavix and statin   • Right renal mass 10/10/2016   • Smoker     for past 43 years   • Urinary tract infection 8/2015   • Vitamin D deficiency 10/01/2015   • Wears glasses    • Wound infection after surgery 02/2019    had back surgery in 12/2018     Current Facility-Administered Medications   Medication Dose Route Frequency Provider Last Rate Last Admin   • sodium chloride 0.9% infusion   Intravenous Continuous Kendra Camp  mL/hr at 12/21/20 1700 New Bag at 12/21/20 1700   • docusate sodium-sennosides (SENOKOT S) 50-8.6 MG 1 tablet  1 tablet Oral BID PRN Kendra Camp DO       • docusate sodium (COLACE) capsule 100 mg  100 mg Oral Daily Kendra Camp DO   100 mg at 12/22/20 0851   • calcium carbonate-vitamin D (CALTRATE+D) 600-400 MG-UNIT tablet 1 tablet  1 tablet Oral BID  Joanna Hines MD   1 tablet at 12/22/20 1819   • cholecalciferol (VITAMIN D) tablet 2,000 Units  2,000 Units Oral Daily Joanna Hines MD   2,000 Units at 12/22/20  0851   • vitamin - therapeutic multivitamins w/minerals tablet 1 tablet  1 tablet Oral Daily Yahaira Jose, RD   1 tablet at 12/22/20 0851   • diphenhydrAMINE (BENADRYL) capsule 25 mg  25 mg Oral TID PRN Joanna Hines MD   25 mg at 12/22/20 0949   • polyethylene glycol (MIRALAX) packet 17 g  17 g Oral Daily Joanna Hines MD   17 g at 12/19/20 1310   • [Held by provider] tamsulosin (FLOMAX) capsule 0.4 mg  0.4 mg Oral BID Joanna Hines MD   0.4 mg at 12/18/20 0820   • benzocaine (ORAJEL) 10 % oral gel   Mouth/Throat PRN Meek Arizmendi MD       • alum-mag hydroxide+simethicone/lidocaine viscous (2:1) (MAGIC MOUTHWASH/GI COCKTAIL) (compounded) oral suspension 15 mL  15 mL Oral Q4H PRN Meek Arizmendi MD   15 mL at 12/02/20 1824   • enoxaparin (LOVENOX) injection 40 mg  40 mg Subcutaneous QHS Kendra Camp DO   40 mg at 12/21/20 2117   • lidocaine 2% urethral (UROJET) 2 % jelly 10 mL  10 mL Transurethral PRN Giana Gleason NP       • ceFAZolin (ANCEF) syringe 2,000 mg  2,000 mg Intravenous 3 times per day DARIA Strong   2,000 mg at 12/22/20 0632   • fluticasone (FLONASE) 50 MCG/ACT nasal spray 2 spray  2 spray Each Nare Nightly Lenora Huggins PA-C   2 spray at 12/11/20 2108   • hydroCORTisone (CORTIZONE) 1 % lotion   Topical BID PRN Fransico John MD       • heparin (porcine) 10 UNIT/ML lock flush 50 Units  50 Units Intravenous PRN Aleksander Martinez MD   50 Units at 11/16/20 2154   • calcium carbonate (TUMS) chewable tablet 500-1,000 mg  500-1,000 mg Oral Q4H PRN DARIA Mccarthy   1,000 mg at 12/20/20 0626   • sodium chloride 0.9 % flush bag 25 mL  25 mL Intravenous PRN Aleksander Martinez MD       • Magnesium Standard Replacement Protocol   Does not apply See Admin Instructions Aleksander Martinez MD       • Potassium Standard Replacement Protocol   Does not apply See Admin Instructions Aleksander Martinez MD       • rifAMPin (RIFADIN) capsule 600 mg  600 mg Oral Daily DARIA Strong    600 mg at 12/22/20 0851   • sodium chloride 0.9 % flush bag 25 mL  25 mL Intravenous PRN Pete Martins MD   Stopped at 11/09/20 1507   • sodium chloride (PF) 0.9 % injection 2 mL  2 mL Intracatheter 2 times per day Pete Martins MD   2 mL at 12/21/20 1838   • ondansetron (ZOFRAN) injection 4 mg  4 mg Intravenous BID PRN Pete Martins MD       • prochlorperazine (COMPAZINE) injection 5 mg  5 mg Intravenous Q4H PRN Pete Martins MD       • acetaminophen (TYLENOL) tablet 650 mg  650 mg Oral Q4H PRN Pete Martins MD   650 mg at 11/09/20 2158   • HYDROcodone-acetaminophen (NORCO) 5-325 MG per tablet 1 tablet  1 tablet Oral Q4H PRN Pete Martins MD   1 tablet at 12/16/20 0010   • bisacodyl (DULCOLAX) suppository 10 mg  10 mg Rectal Daily PRN Pete Martins MD       • magnesium hydroxide (MILK OF MAGNESIA) 400 MG/5ML suspension 30 mL  30 mL Oral Daily PRN Pete Martins MD       • aluminum-magnesium hydroxide-simethicone (MAALOX) 200-200-20 MG/5ML suspension 30 mL  30 mL Oral Q4H PRN Pete Martins MD       • levETIRAcetam (KepPRA) tablet 500 mg  500 mg Oral BID Nato Roldan MD   500 mg at 12/22/20 0851   • atorvastatin (LIPITOR) tablet 10 mg  10 mg Oral Daily Nato Roldan MD   10 mg at 12/22/20 0851   • finasteride (PROSCAR) tablet 5 mg  5 mg Oral Daily Nato Roldan MD   5 mg at 12/22/20 0851   • famotidine (PEPCID) tablet 20 mg  20 mg Oral 2 times per day Nato Roldan MD   20 mg at 12/22/20 0851     ALLERGIES:  No Known Allergies  Active Problems:    Soft tissue radionecrosis    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 rate 18, height 6' 1\" (1.854 m), weight 69.2 kg, SpO2 98 %.    Subjective:  Symptoms:  He reports malaise.  No shortness of breath, cough, chest pain, weakness, headache, chest pressure, anorexia, diarrhea or anxiety.    Diet:  Adequate intake.   11:59 PM     No results found for: \"LITHIUM\"  Lab Results   Component Value Date    VALPROATE <2.8 (L) 06/28/2024     Lab Results   Component Value Date/Time    VALPROATE <2.8 06/28/2024 11:59 PM       FURTHER LABS ORDERED :      Radiology none  No results found.    EKG: okTRACING REVIEWED    TREATMENT PLAN:    Risk Management:  monitor psychosis escalation and tolerance distress    Collateral Information:  Will obtain collateral information from the family or friends.  Will obtain medical records as appropriate from out patient providers  Will consult the hospitalist for a physical exam to rule out any co-morbid physical condition.    Home medication Reconciled       New Medications started during this admission :    Start invega  Depakote suboxone  Prn Haldol 5mg and Vistaril 50mg q6hr for extreme agitation.  Trazodone as ordered for insomnia  Vistaril as ordered for anxiety  Discussed with the patient risk, benefit, alternative and common side effects for the  proposed medication treatment. Patient is consenting to the treatment.    Psychotherapy:   Encourage participation in milieu and group therapy  Individual therapy as needed        Behavioral Services  Medicare Certification      Admission Day 1  I certify that this patient's inpatient psychiatric hospital admission is medically necessary for:     (1) treatment which could reasonably be expected to improve this patient's condition, or     (2) diagnostic study or its equivalent.       Electronically signed by Althea Villalpando MD on 6/29/2024 at 4:05 PMDepartment of Psychiatry              Behavioral Services  Medicare Certification      Admission Day 1  I certify that this patient's inpatient psychiatric hospital admission is medically necessary for:     (1) treatment which could reasonably be expected to improve this patient's condition, or     (2) diagnostic study or its equivalent.    No nausea or vomiting.    Activity level: Impaired due to weakness.    Pain:  He reports no pain.      Objective:  General Appearance:  In no acute distress.    Vital signs: (most recent): Blood pressure (!) 182/86, pulse 70, temperature 97.5 °F (36.4 °C), temperature source Oral, resp. rate 18, height 6' 1\" (1.854 m), weight 69.2 kg, SpO2 98 %.    Output: Producing urine.    HEENT: Normal HEENT exam.    Lungs:  Normal effort and normal respiratory rate.  Breath sounds clear to auscultation.    Heart: Normal rate.  Regular rhythm.  S1 normal and S2 normal.    Abdomen: Abdomen is soft and flat.  There is no abdominal tenderness.  There is no rebound tenderness.  There is no guarding.   There is no mass. There is no splenomegaly. There is no hepatomegaly.   Extremities: Decreased range of motion.  There is no dependent edema or local swelling.    Pulses: Distal pulses are intact.    Neurological: Patient is alert and oriented to person, place and time.    Pupils:  Pupils are equal, round, and reactive to light.    Skin:  Dry.  No rash, ecchymosis, cyanosis or ulceration.     Assessment & Plan  · History of low-grade papillary urothelial carcinoma, pT1.  No documented muscle invasion.  He received several BCG treatments under the direction of  Dr. Mariano Salguero.  The patient required a Colorado catheter which could not be removed at least for now where he has it for more than 1 year.  · Metastatic renal cell carcinoma.  The patient did have significant metastatic disease to the lung , liver as well as the bone he does have a sustained respond to immunotherapy which is still ongoing.  He required surgery to the bone including bilateral femurs and also humerus.  Received palliative radiation therapy to 4 sites in the past.  · Of note the patient did receive radiation therapy to the operative bed after the lateral corpectomy with posterior stabilization surgery with T11 laminectomy, posterior lateral T9-L1 fusion surgery on  December 31, 2018 for bone Mets from the renal cell carcinoma.  The radiation therapy dose was 3000 cGy delivered between January 29 until February 12, 2019.   · He has been on immunotherapy since about February 2019 and responding.  The immunotherapy will be continued as an outpatient after his discharge.  · History of brain Mets felt to be related to the renal cell carcinoma identified October 2019 status post whole brain RT with a response.  MRI of the brain with and without contrast performed November 9, 2020 showed treated metastasis, other findings noted.  He has no CNS symptoms.  · On November 9 2020 the patient had MRI of the thoracic spine showed postsurgical changes.  There is fluid collection at T11 level.  Indeterminate for postoperative seroma, organized hematoma or less likely abscess.  There are residual mild paraspinal enhancing changes centered at T11 decreased from the prior MRI r related to post treatment changes.  No evidence of significant disease progression.  The lumbar spine MR showed postsurgical changes as described.  No stenosis and no evidence of Mets.    · Dehisced surgical wound in the setting of metastatic renal cell cancer or previous radiation. He has had prior laminectomy to remove pressure over wounded area (Sept 2019).  Now being followed closely by wound care.     S/P debridement and partial laminectomy performed (sample sent for culture and pathology) per Dr. Chew on 11/10/2020.  Surgical path came back and showed acute osteomyelitis/radionecrosis of the bone and soft tissue.  He has been on the antibiotics since November 7, 2020 and according to the ID the Endo therapy will be December 21, 2020, then oral suppressive antibiotics.       · Microbiology showed Staph aureus and the sensitivity reviewed g.  MRSA is negative from the nars.  Changed to IV cefazolin and continued rifampin, intravenous antibiotics is planned until December 24, 2020.  The long-term plan for p.o.  antibiotics for suppression life time.  · The patient started on HBO therapy on November 16, 2020. VAC applied as of November 19, 2020. There is improvement in the wound granulation according to the pictures provided by the wound care team, no purulent discharge and no malodor.  As of December 26 the patient had 30 HBO treatment of anticipated 30-40.  Overall he appears to be improving with HBO.  The patient does have a VAC which is being exchanged 3 times a week.    ·   · Will continue with nivolumab as an outpatient.  Last dose was in early November 2020. Unfortunately this drug cannot be administered while he is inpatient.  Will be resumed as an outpatient    · TSH 6.477, minimally elevated however T4-3 is normal.  Acth is within acceptable limits.  Cortisol level is within normal limits.  All performed December 19, 2020 given his hypotension episodes.  No signs of endocrinopathy from the immunotherapy that he had received.  CBCs on changed with relatively stable hemoglobin.      ·  Recent CT scan discussed with the patient as well as radiology.  The 2 lesions in the gallbladder are unchanged as well as the lesion to the right kidney.  There is thickening of the urinary bladder.  No major changes as well.   · All his medications reviewed.  He is on Lovenox prophylaxis without side effects.  · Please page me at [phone] for any questions and no need to go through the answering service whether it is a holiday or weekend.    Aleksandar Reynoso MD

## 2024-06-29 NOTE — ED NOTES
Patient changed into psych safe clothing.   Skin and contraband check performed.   Contraband check negative at this time.   Lab called to draw blood.

## 2024-06-29 NOTE — PLAN OF CARE
Problem: Anxiety  Goal: Will report anxiety at manageable levels  Description: INTERVENTIONS:  1. Administer medication as ordered  2. Teach and rehearse alternative coping skills  3. Provide emotional support with 1:1 interaction with staff  Outcome: Progressing  Flowsheets (Taken 6/29/2024 1110)  Will report anxiety at manageable levels: Provide emotional support with 1:1 interaction with staff     Problem: Coping  Goal: Pt/Family able to verbalize concerns and demonstrate effective coping strategies  Description: INTERVENTIONS:  1. Assist patient/family to identify coping skills, available support systems and cultural and spiritual values  2. Provide emotional support, including active listening and acknowledgement of concerns of patient and caregivers  3. Reduce environmental stimuli, as able  4. Instruct patient/family in relaxation techniques, as appropriate  5. Assess for spiritual pain/suffering and initiate Spiritual Care, Psychosocial Clinical Specialist consults as needed  Outcome: Progressing  Flowsheets (Taken 6/29/2024 1110)  Patient/family able to verbalize anxieties, fears, and concerns, and demonstrate effective coping: Provide emotional support, including active listening and acknowledgement of concerns of patient and caregivers     Problem: Depression/Self Harm  Goal: Effect of psychiatric condition will be minimized and patient will be protected from self harm  Description: INTERVENTIONS:  1. Assess impact of patient's symptoms on level of functioning, self care needs and offer support as indicated  2. Assess patient/family knowledge of depression, impact on illness and need for teaching  3. Provide emotional support, presence and reassurance  4. Assess for possible suicidal thoughts or ideation. If patient expresses suicidal thoughts or statements do not leave alone, initiate Suicide Precautions, move to a room close to the nursing station and obtain sitter  5. Initiate consults as appropriate

## 2024-06-29 NOTE — CONSULTS
Paternal Aunt     No Known Problems Paternal Uncle     No Known Problems Maternal Grandmother     No Known Problems Maternal Grandfather     No Known Problems Paternal Grandmother     No Known Problems Paternal Grandfather     No Known Problems Maternal Cousin     No Known Problems Paternal Cousin        Review of systems:  Constitutional: no fever, no night sweats, no fatigue  Head: no headache, no head injury, no migranes.  Eye: no blurring of vision, no double vision.  Ears: no hearing difficulty, no tinnitus  Mouth/throat: no ulceration, dental caries, dysphagia  Lungs: no cough, no shortness of breath, no wheeze  CVS: no palpitation, no chest pain, no shortness of breath  GI: no abdominal pain, no nausea , no vomiting, no constipation  JENNY: no dysuria, frequency and urgency, no hematuria, no kidney stones  Musculoskeletal: no joint pain, swelling , stiffness  Endocrine: no polyuria, polydypsia, no cold or heat intolerence  Hematology: no anemia, no easy brusing or bleeding, no hx of clotting disorder  Dermatology: no skin rash, no eczema, no prurities,  Psychiatry: no depression, no anxiety,no panic attacks, no suicide ideation  Neurology: no syncope, no seizures, no numbness or tingling of hands, no numbness or tingling of feet, no paresis               PHYSICAL EXAM:  BP (!) 108/58   Pulse 85   Temp 98.4 °F (36.9 °C) (Oral)   Resp 16   Ht 1.7 m (5' 6.93\")   Wt 77.1 kg (170 lb)   SpO2 99%   BMI 26.68 kg/m²   General:  Awake, alert, not in distress. Appears to be stated age.  HEENT: Atraumatic, normocephalic. PERRLA. EOM intact. Pink conjunctiva, anicteric sclera. Pink and moist oral mucosa. No lymphadenopathy. Trachea midline. Thyroid non palpable. Neck supple. No carotid bruit. No JVD.  Chest: Bilateal air entry, Clear to auscultation, no wheezing, rhonchi or rales.  Cardiovascular: RRR, S1S2, no murmur, click, rub or gallop. No lower extremity edema. Pedal and posterior tibialis 2+.   Abdomen: Soft,

## 2024-06-29 NOTE — ED NOTES
Patient reports he has been having auditory hallucinations and anxiety. Reports he takes paliperidone. Patient oriented to unit and room. No distress noted.

## 2024-06-29 NOTE — CARE COORDINATION
Brief Intervention and Referral to Treatment Summary    Patient was provided PHQ-9, AUDIT-C and DAST Screening:      PHQ-9 Score: 2  AUDIT-C Score:  2  DAST Score:  0    Patient’s substance use is considered     Low Risk/Healthy      Patient’s depression is considered:     Minimal    Brief Education Was Provided    Patient was receptive      Brief Intervention Is Provided (Only for AUDIT-C or DAST)     Patient reports readiness to decrease and/or stop use and a plan was discussed x  Patient denies readiness to decrease and/or stop use and a plan was not discussed    Recommendations/Referrals for Brief and/or Specialized Treatment Provided to Patient:      Pt reports motivation for sobriety but denies L[facility] contact. Pt has been sober for 3 years from polysubstance use (ETOH use is currently but minimal). Pt attends [facility] currently.

## 2024-06-29 NOTE — ED NOTES
Patient sitting at the bedside, internally stimulated, calm. Respirations are even and unlabored. No distress noted at this time.

## 2024-06-30 LAB
ALBUMIN SERPL-MCNC: 3.8 G/DL (ref 3.5–4.6)
ALP SERPL-CCNC: 85 U/L (ref 35–104)
ALT SERPL-CCNC: 9 U/L (ref 0–41)
AST SERPL-CCNC: 12 U/L (ref 0–40)
BILIRUB DIRECT SERPL-MCNC: <0.2 MG/DL (ref 0–0.4)
BILIRUB INDIRECT SERPL-MCNC: NORMAL MG/DL (ref 0–0.6)
BILIRUB SERPL-MCNC: 0.3 MG/DL (ref 0.2–0.7)
PROT SERPL-MCNC: 6.3 G/DL (ref 6.3–8)

## 2024-06-30 PROCEDURE — 6360000002 HC RX W HCPCS: Performed by: PSYCHIATRY & NEUROLOGY

## 2024-06-30 PROCEDURE — 1240000000 HC EMOTIONAL WELLNESS R&B

## 2024-06-30 PROCEDURE — 80076 HEPATIC FUNCTION PANEL: CPT

## 2024-06-30 PROCEDURE — 6370000000 HC RX 637 (ALT 250 FOR IP): Performed by: PSYCHIATRY & NEUROLOGY

## 2024-06-30 PROCEDURE — 36415 COLL VENOUS BLD VENIPUNCTURE: CPT

## 2024-06-30 RX ADMIN — DIVALPROEX SODIUM 500 MG: 500 TABLET, EXTENDED RELEASE ORAL at 09:16

## 2024-06-30 RX ADMIN — PALIPERIDONE 6 MG: 6 TABLET, EXTENDED RELEASE ORAL at 09:16

## 2024-06-30 RX ADMIN — BUPRENORPHINE AND NALOXONE 1 TABLET: 8; 2 TABLET SUBLINGUAL at 09:16

## 2024-06-30 RX ADMIN — BUPRENORPHINE AND NALOXONE 1 TABLET: 8; 2 TABLET SUBLINGUAL at 14:30

## 2024-06-30 NOTE — PLAN OF CARE
Problem: Anxiety  Goal: Will report anxiety at manageable levels  Description: INTERVENTIONS:  1. Administer medication as ordered  2. Teach and rehearse alternative coping skills  3. Provide emotional support with 1:1 interaction with staff  6/30/2024 1050 by Emilio Stevens RN  Outcome: Progressing  Flowsheets (Taken 6/30/2024 1048)  Will report anxiety at manageable levels: Provide emotional support with 1:1 interaction with staff  6/30/2024 0049 by Alie Vela RN  Outcome: Progressing     Problem: Coping  Goal: Pt/Family able to verbalize concerns and demonstrate effective coping strategies  Description: INTERVENTIONS:  1. Assist patient/family to identify coping skills, available support systems and cultural and spiritual values  2. Provide emotional support, including active listening and acknowledgement of concerns of patient and caregivers  3. Reduce environmental stimuli, as able  4. Instruct patient/family in relaxation techniques, as appropriate  5. Assess for spiritual pain/suffering and initiate Spiritual Care, Psychosocial Clinical Specialist consults as needed  6/30/2024 1050 by Emilio Stevens RN  Outcome: Progressing  Flowsheets (Taken 6/30/2024 1048)  Patient/family able to verbalize anxieties, fears, and concerns, and demonstrate effective coping: Provide emotional support, including active listening and acknowledgement of concerns of patient and caregivers  6/30/2024 0049 by Alie Vela RN  Outcome: Progressing     Problem: Depression/Self Harm  Goal: Effect of psychiatric condition will be minimized and patient will be protected from self harm  Description: INTERVENTIONS:  1. Assess impact of patient's symptoms on level of functioning, self care needs and offer support as indicated  2. Assess patient/family knowledge of depression, impact on illness and need for teaching  3. Provide emotional support, presence and reassurance  4. Assess for possible suicidal thoughts or ideation. If

## 2024-06-30 NOTE — PLAN OF CARE
Problem: Anxiety  Goal: Will report anxiety at manageable levels  6/30/2024 0049 by Alie Vela RN  Outcome: Progressing  6/29/2024 1112 by Emilio Stevens RN  Outcome: Progressing     Problem: Coping  Goal: Pt/Family able to verbalize concerns and demonstrate effective coping strategies  6/30/2024 0049 by Alie Vela RN  Outcome: Progressing  6/29/2024 1112 by Emilio Stevens RN  Outcome: Progressing     Problem: Depression/Self Harm  Goal: Effect of psychiatric condition will be minimized and patient will be protected from self harm  6/30/2024 0049 by Alie Vela RN  Outcome: Progressing  6/29/2024 1112 by Emilio Stevens RN  Outcome: Progressing     Problem: Involuntary Admit  Goal: Will cooperate with staff recommendations and doctor's orders and will demonstrate appropriate behavior  6/30/2024 0049 by Alie Vela RN  Outcome: Progressing  6/29/2024 1112 by Emilio Stevens RN  Outcome: Progressing     Problem: Risk for Elopement  Goal: Patient will not exit the unit/facility without proper excort  6/30/2024 0049 by Alie Vela RN  Outcome: Progressing  6/29/2024 1112 by Emilio Stevens RN  Outcome: Progressing

## 2024-07-01 PROCEDURE — 6370000000 HC RX 637 (ALT 250 FOR IP): Performed by: PSYCHIATRY & NEUROLOGY

## 2024-07-01 PROCEDURE — 6360000002 HC RX W HCPCS: Performed by: PSYCHIATRY & NEUROLOGY

## 2024-07-01 PROCEDURE — 99232 SBSQ HOSP IP/OBS MODERATE 35: CPT | Performed by: PSYCHIATRY & NEUROLOGY

## 2024-07-01 PROCEDURE — 1240000000 HC EMOTIONAL WELLNESS R&B

## 2024-07-01 RX ADMIN — PALIPERIDONE 6 MG: 6 TABLET, EXTENDED RELEASE ORAL at 08:45

## 2024-07-01 RX ADMIN — BUPRENORPHINE AND NALOXONE 1 TABLET: 8; 2 TABLET SUBLINGUAL at 13:27

## 2024-07-01 RX ADMIN — DIVALPROEX SODIUM 500 MG: 500 TABLET, EXTENDED RELEASE ORAL at 08:45

## 2024-07-01 RX ADMIN — BUPRENORPHINE AND NALOXONE 1 TABLET: 8; 2 TABLET SUBLINGUAL at 08:45

## 2024-07-01 NOTE — PLAN OF CARE
Pt is isolative to room. Calm, cooperative, friendly. Rates anxiety and depression both 4/10 with 10 being the worst. Denies SI/HI/AVH. States sleep is improved and he feels as though he needed to rejuvenate and catch up on all the sleep he had missed prior to coming to the hospital. Congruent appropriate affect. Pt showered and tending to ADLs. States tonight he plans to make a list of all the short term goals he would like to accomplish in the near future. Pt does endorse feeling preoccupied with a few thoughts but does not elaborate. Will continue to monitor.    Problem: Anxiety  Goal: Will report anxiety at manageable levels  Description: INTERVENTIONS:  1. Administer medication as ordered  2. Teach and rehearse alternative coping skills  3. Provide emotional support with 1:1 interaction with staff  7/1/2024 1844 by Bryanna Jamison RN  Outcome: Progressing  7/1/2024 1050 by Jose River RN  Outcome: Progressing  Flowsheets (Taken 7/1/2024 1045)  Will report anxiety at manageable levels:   Provide emotional support with 1:1 interaction with staff   Teach and rehearse alternative coping skills     Problem: Coping  Goal: Pt/Family able to verbalize concerns and demonstrate effective coping strategies  Description: INTERVENTIONS:  1. Assist patient/family to identify coping skills, available support systems and cultural and spiritual values  2. Provide emotional support, including active listening and acknowledgement of concerns of patient and caregivers  3. Reduce environmental stimuli, as able  4. Instruct patient/family in relaxation techniques, as appropriate  5. Assess for spiritual pain/suffering and initiate Spiritual Care, Psychosocial Clinical Specialist consults as needed  7/1/2024 1844 by Bryanna Jamison RN  Outcome: Progressing  7/1/2024 1050 by Jose River RN  Outcome: Progressing  Flowsheets (Taken 7/1/2024 1045)  Patient/family able to verbalize anxieties, fears, and concerns, and demonstrate

## 2024-07-01 NOTE — GROUP NOTE
Patient did not attend group.    Date: 7/1/2024    Group Start Time: 0935  Group End Time: 1030  Group Topic: Music Therapy    [facility] 3W Lillian Alvarez    Identity Development & Personal Strength Identification through Music  Aida Analysis/Song Discussion, Receptive Music Listening, Exploration, Clarification    Patients will listen to \"Who You Are\" by Deanna LEMON & \"Strength, Courage, and [place]\" by Nanci Solo and identify themes/lyrics/interpretations derived from each song. Patients will discuss personal strengths, challenges they've overcome, and how they got through difficult times.     Themes explored in group discussion today: Generational/societal expectations; impact/influence of past trauma on our thinking/identity; peer perception versus self-perception in terms of mental health recovery     Goals: Improve Mood, Improve Insight/Self-Awareness, Increase Socialization/Community Building, Improve Self-Expression, Improve Attention to Task, Decrease Negative Thinking, Promote Identity Development     Documentation completed by Lillian Navarro, MT-BC, PsychoEd Spec

## 2024-07-01 NOTE — CARE COORDINATION
7/1/2024 @ 0836 - Patient was approached regarding the completion of the Leisure Assessment. He was isolating in his room. Patient stated his coping strategies involve spending time with his children, going places like [place], and working out. Patient stated he is social primarily with his [employer] family and looks to his [employer] family for support. Patient stated his self esteem is intact and he feels successful at most things he attempts. Patient stated his strength is being positive. He is going to use this hospitalization to get rest. Patient stated he has had trouble sleeping.     Documentation completed by: Kathie Meier MA ATR

## 2024-07-01 NOTE — GROUP NOTE
Group Therapy Note    Date: 7/1/2024    Group Start Time: 1630  Group End Time: 1730  Group Topic: Recreational    [facility] 3[facility] Katey Ruelas      Today's group consisted of all group members starting off by introducing themselves by their first name and sharing what they believe to be their biggest strength. Each member of the group was given two handouts of drawings created to simulate people in different situations. Each patient was asked to choose which character in the drawing they resonated with the most and describe briefly why. At the end of today's session as a whole, the group gave a personality trait to each character/figure on the drawing and we compared them to what the illustrator had labeled their personalities as.   Group Therapy Note    Attendees: 9/23     Notes:  Pt did not attend.     Modes of Intervention: Exploration and Activity      Discipline Responsible: Behavorial Health Tech      Signature:  Katey Vogt

## 2024-07-01 NOTE — GROUP NOTE
Group Therapy Note    Date: 7/1/2024    Group Start Time: 1400  Group End Time: 1500  Group Topic:  Group    [facility] 3W Marietta Jaramillo LSW        Group Therapy Note    Attendees: 9       Patient's Goal:  Patient did not attend.        Discipline Responsible: /Counselor      Signature:  NATI Roman

## 2024-07-01 NOTE — PLAN OF CARE
Patient isolative and withdrawn to his room. Cooperative with lab draw for hepatic panel. Pt reports he is \"catching up\" on sleep because prior to admit was not sleeping well. Pt denies SI, HI and AVH. Pt does not appear internally stimulated. Mood is pleasant and friendly. Pt rates his depression and anxiety levels both 4/10 (with 10 being the highest). Pt offers no complaints to staff.   Problem: Anxiety  Goal: Will report anxiety at manageable levels  6/30/2024 2131 by Alie Vela RN  Outcome: Progressing  6/30/2024 1050 by Emilio Stevens RN  Outcome: Progressing     Problem: Coping  Goal: Pt/Family able to verbalize concerns and demonstrate effective coping strategies  6/30/2024 2131 by Alie Vela RN  Outcome: Progressing  6/30/2024 1050 by Emilio Stevens RN  Outcome: Progressing     Problem: Depression/Self Harm  Goal: Effect of psychiatric condition will be minimized and patient will be protected from self harm  6/30/2024 2131 by Alie Vela RN  Outcome: Progressing  6/30/2024 1050 by Emilio Stevens RN  Outcome: Progressing     Problem: Involuntary Admit  Goal: Will cooperate with staff recommendations and doctor's orders and will demonstrate appropriate behavior  6/30/2024 2131 by Alie Vela RN  Outcome: Progressing  6/30/2024 1050 by Emilio Stevens RN  Outcome: Progressing     Problem: Risk for Elopement  Goal: Patient will not exit the unit/facility without proper excort  6/30/2024 2131 by Alie Vela RN  Outcome: Progressing  6/30/2024 1050 by Emilio Stevens RN  Outcome: Progressing

## 2024-07-01 NOTE — PLAN OF CARE
Patient denied anxiety, depression, AVH, SI, HI or racing thoughts.  Patient did get up and shower today, but has been isolative. He was encouraged to start going to groups and to continue to verbalize his feelings. 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     Problem: Anxiety  Goal: Will report anxiety at manageable levels  Description: INTERVENTIONS:  1. Administer medication as ordered  2. Teach and rehearse alternative coping skills  3. Provide emotional support with 1:1 interaction with staff  7/1/2024 1050 by Jose River RN  Outcome: Progressing  Flowsheets (Taken 7/1/2024 1045)  Will report anxiety at manageable levels:   Provide emotional support with 1:1 interaction with staff   Teach and rehearse alternative coping skills  6/30/2024 2131 by Alie Vela RN  Outcome: Progressing     Problem: Coping  Goal: Pt/Family able to verbalize concerns and demonstrate effective coping strategies  Description: INTERVENTIONS:  1. Assist patient/family to identify coping skills, available support systems and cultural and spiritual values  2. Provide emotional support, including active listening and acknowledgement of concerns of patient and caregivers  3. Reduce environmental stimuli, as able  4. Instruct patient/family in relaxation techniques, as appropriate  5. Assess for spiritual pain/suffering and initiate Spiritual Care, Psychosocial Clinical Specialist consults as needed  7/1/2024 1050 by Jose River RN  Outcome: Progressing  Flowsheets (Taken 7/1/2024 1045)  Patient/family able to verbalize anxieties, fears, and concerns, and demonstrate effective coping:   Reduce environmental stimuli, as able   Instruct patient/family in relaxation techniques, as appropriate   Provide emotional support, including active listening and acknowledgement of concerns of patient and caregivers   Assist patient/family to identify coping skills, available

## 2024-07-01 NOTE — CARE COORDINATION
Family/Support Name: Avelina  Contact #: [phone]  Relationship to Patient: mother     Placed call to above for collateral information, left a HIPAA compliant voicemail to return the call.

## 2024-07-02 LAB
EKG ATRIAL RATE: 51 BPM
EKG P AXIS: 41 DEGREES
EKG P-R INTERVAL: 132 MS
EKG Q-T INTERVAL: 430 MS
EKG QRS DURATION: 98 MS
EKG QTC CALCULATION (BAZETT): 396 MS
EKG R AXIS: 72 DEGREES
EKG T AXIS: 49 DEGREES
EKG VENTRICULAR RATE: 51 BPM

## 2024-07-02 PROCEDURE — 1240000000 HC EMOTIONAL WELLNESS R&B

## 2024-07-02 PROCEDURE — 6360000002 HC RX W HCPCS: Performed by: PSYCHIATRY & NEUROLOGY

## 2024-07-02 PROCEDURE — 90833 PSYTX W PT W E/M 30 MIN: CPT | Performed by: PSYCHIATRY & NEUROLOGY

## 2024-07-02 PROCEDURE — 6370000000 HC RX 637 (ALT 250 FOR IP): Performed by: PSYCHIATRY & NEUROLOGY

## 2024-07-02 PROCEDURE — 93010 ELECTROCARDIOGRAM REPORT: CPT | Performed by: INTERNAL MEDICINE

## 2024-07-02 PROCEDURE — 99232 SBSQ HOSP IP/OBS MODERATE 35: CPT | Performed by: PSYCHIATRY & NEUROLOGY

## 2024-07-02 RX ORDER — BENZOCAINE/MENTHOL 6 MG-10 MG
LOZENGE MUCOUS MEMBRANE 2 TIMES DAILY
Status: DISCONTINUED | OUTPATIENT
Start: 2024-07-03 | End: 2024-07-03 | Stop reason: HOSPADM

## 2024-07-02 RX ORDER — DIAPER,BRIEF,INFANT-TODD,DISP
EACH MISCELLANEOUS 2 TIMES DAILY
Status: DISCONTINUED | OUTPATIENT
Start: 2024-07-02 | End: 2024-07-02 | Stop reason: CLARIF

## 2024-07-02 RX ADMIN — HYDROCORTISONE: 0.01 CREAM TOPICAL at 20:52

## 2024-07-02 RX ADMIN — PALIPERIDONE 6 MG: 6 TABLET, EXTENDED RELEASE ORAL at 09:05

## 2024-07-02 RX ADMIN — DIVALPROEX SODIUM 500 MG: 500 TABLET, EXTENDED RELEASE ORAL at 09:05

## 2024-07-02 RX ADMIN — BUPRENORPHINE AND NALOXONE 1 TABLET: 8; 2 TABLET SUBLINGUAL at 09:05

## 2024-07-02 RX ADMIN — BUPRENORPHINE AND NALOXONE 1 TABLET: 8; 2 TABLET SUBLINGUAL at 14:12

## 2024-07-02 NOTE — GROUP NOTE
Patient independently completed his playlist and selected a song to add to the group playlist, but left group before music listening began.     Date: 7/2/2024    Group Start Time: 0935  Group End Time: 1050  Group Topic: Music Therapy    [facility] 3W Lillian Alvarez    Iso-Principle & Mood  Playlist Building and Receptive Music Listening    Patients will learn about the \"iso-principle\", or the idea of matching music to your mood in order to gradually change it. Patients will identify how they feel currently and select songs to gradually alter their mood. Patients will select one song from their personal playlist to add to the group playlist. Patients will listen to the group playlist and reflect on how it affected their mood. Patients will discuss the outcomes of the experience and if they could see themselves using it as a coping skill in the future.    Focus: Emotional Awareness/Regulation, Processing, Coping Skills    Goals: Improve Mood, Improve Insight/Self-Awareness, Improve Self-Expression, Improve Attention to Task, Improve Coping Skills/Develop Coping Skills, Improve Emotion Awareness/Regulation     Documentation completed by Lillian Navarro MT-BC, PsychoEd Spec

## 2024-07-02 NOTE — FLOWSHEET NOTE
Pt is visible out on the unit and walks with a steady gait. Pt reports showering today and feels his mood is getting better. Pt has a congruent appropriate affect. Pt states \" I was not sleeping at home and wasn't taking any time to myself.\" Pt has been calm and cooperative and states his sleep is better since he has been here. Pt denies audiovisual hallucinations. Pt denies SI/HI.

## 2024-07-02 NOTE — CARE COORDINATION
FAMILY COLLATERAL NOTE    Family/Support Name: Avelina  Contact #: [phone]  Relationship to Pt:: Mother    Placed call to above for collateral information, left HIPAA compliant voicemail to return the call.     Family/Support contact aware of hospitalization:  Yes    Presenting Symptoms/Current Concerns:  -Patient was hearing voices.  -She is not around patient a lot and does not live with patient.  -Patient told he he was not feeling right and that he needs help.  -Patient had been communicated with  brother.  -Not on correct medication  -Mother is 76 years old and lives in a senior living community.    Top 3 Life Stressors:   -Daily life  -Always something going on with him and he is never at ease and always uncomfortable.    Background History Relevant to Current Hospitalization:  -Prior hospitalization for mental health issues.    Family Mental Health/Substance Use History:   Sister bipolar    Support Network's Goal for Hospitalization:   To be on the right medication and receive help.    Discharge Plan: Return home (Mother reports he bounces from place to place)    Support Network Supportive of Discharge Plan:   Yes    Support can confirm Safety of Location and Security of Weapons:   Collateral does not live with patient.    Support agreeable to Safeguard and Monitor Medications (including Prescription and OTC):   Collateral does not liver with patient.    Identified Barriers to Compliance with Discharge Plan:   Medication compliance    Recommendations for Support Network:   Available for follow up calls.      NATI Chavis

## 2024-07-02 NOTE — GROUP NOTE
Group Therapy Note    Date: 7/2/2024    Group Start Time: 1730  Group End Time: 1800  Group Topic: Recreational    [facility] 3W Katey Ruelas        Group Therapy Note    Attendees: 8/24     Notes:  Pt did not attend.     Modes of Intervention: Support and Activity      Discipline Responsible: Behavorial Health Tech      Signature:  Katey Vogt

## 2024-07-02 NOTE — PLAN OF CARE
Provided with shower supplies and let in to shower. Reports daily BM, good sleep and appetite. Pt's phone currently on , per request so he can retrieve his 's phone number.       Problem: Anxiety  Goal: Will report anxiety at manageable levels  Description: INTERVENTIONS:  1. Administer medication as ordered  2. Teach and rehearse alternative coping skills  3. Provide emotional support with 1:1 interaction with staff  7/2/2024 1039 by Yuki Stevens RN  Outcome: Progressing  7/1/2024 2245 by Bryanna Jamison RN  Outcome: Progressing     Problem: Coping  Goal: Pt/Family able to verbalize concerns and demonstrate effective coping strategies  Description: INTERVENTIONS:  1. Assist patient/family to identify coping skills, available support systems and cultural and spiritual values  2. Provide emotional support, including active listening and acknowledgement of concerns of patient and caregivers  3. Reduce environmental stimuli, as able  4. Instruct patient/family in relaxation techniques, as appropriate  5. Assess for spiritual pain/suffering and initiate Spiritual Care, Psychosocial Clinical Specialist consults as needed  7/2/2024 1039 by Yuki Stevens RN  Outcome: Progressing  7/1/2024 2245 by Bryanna Jamison RN  Outcome: Progressing     Problem: Depression/Self Harm  Goal: Effect of psychiatric condition will be minimized and patient will be protected from self harm  Description: INTERVENTIONS:  1. Assess impact of patient's symptoms on level of functioning, self care needs and offer support as indicated  2. Assess patient/family knowledge of depression, impact on illness and need for teaching  3. Provide emotional support, presence and reassurance  4. Assess for possible suicidal thoughts or ideation. If patient expresses suicidal thoughts or statements do not leave alone, initiate Suicide Precautions, move to a room close to the nursing station and obtain sitter  5. Initiate consults as

## 2024-07-02 NOTE — GROUP NOTE
Date: 7/2/2024    Group Start Time: 1330  Group End Time: 1410  Group Topic: Music Therapy    [facility] 3W Lillian Alvarez Collage  Receptive Music Listening, Aida Analysis/Song Discussion, and Art Reflection    Patients will choose from a variety of pre-selected song lyrics and magazine pictures to arrange into a collage representing where they are at today / who they are. Patients will be encouraged to add color and additional words to their collage if they so choose. Patients will have the opportunity to share their collage with the group and reflect on the experience as a whole. Patients will listen to an MT-curated playlist comprised of the songs where the pre-selected lyrics originate.    Focus: Creativity, Self-Expression, and Processing    Goals: Improve Mood, Improve Insight/Self-Awareness, Increase Socialization/Community Building, Increase Self-Expression, Improve Attention to Task, Improve Coping Skills/Develop Coping Skills, Increase Sensory Stimulation    Patient appropriately utilized art materials and song lyrics for his collage. Patient left group and returned a while later. Patient expressed some discomfort in sitting at the table with peers, stating \"I don't know, I'm a weird shailesh, I don't know.\" Patient sat with peers and engaged in small conversations with encouragement from MT. Patient briefly mentioned his children, whose names he was outlining and coloring during group.     Attended: 1/2-3/4 attendance  Participation Level: Active Listener  Participation Quality: Attentive and Sharing  Affect/Mood: Constricted/Euthymic  Speech: spontaneous, normal rate, and normal volume   Thought Content/Processes: Vague and Disorganized  Level of consciousness: Alert  Response: Able to verbalize current knowledge/experience  Outcomes: Improving and Actively participated in the group experience    Discipline Responsible: Music Therapist  Signature: Lillian Navarro, MT-BC, PsychEd Spec

## 2024-07-02 NOTE — GROUP NOTE
Group Therapy Note    Date: 7/1/2024    Group Start Time: 2000  Group End Time: 2035  Group Topic: Wrap-Up    [facility] 3W Katey Ruelas        Group Therapy Note    Attendees: 11/24    Notes:  Pt did not attend.     Modes of Intervention: Activity      Discipline Responsible: Behavorial Health Tech      Signature:  Katey Vogt

## 2024-07-03 VITALS
BODY MASS INDEX: 26.68 KG/M2 | SYSTOLIC BLOOD PRESSURE: 124 MMHG | TEMPERATURE: 98.2 F | RESPIRATION RATE: 18 BRPM | OXYGEN SATURATION: 100 % | DIASTOLIC BLOOD PRESSURE: 77 MMHG | WEIGHT: 170 LBS | HEIGHT: 67 IN | HEART RATE: 97 BPM

## 2024-07-03 LAB — VALPROATE SERPL-MCNC: 45 UG/ML (ref 50–100)

## 2024-07-03 PROCEDURE — 6370000000 HC RX 637 (ALT 250 FOR IP): Performed by: PSYCHIATRY & NEUROLOGY

## 2024-07-03 PROCEDURE — 36415 COLL VENOUS BLD VENIPUNCTURE: CPT

## 2024-07-03 PROCEDURE — 80164 ASSAY DIPROPYLACETIC ACD TOT: CPT

## 2024-07-03 PROCEDURE — 6360000002 HC RX W HCPCS: Performed by: PSYCHIATRY & NEUROLOGY

## 2024-07-03 RX ORDER — DIVALPROEX SODIUM 500 MG/1
500 TABLET, EXTENDED RELEASE ORAL DAILY
Qty: 15 TABLET | Refills: 0 | Status: SHIPPED | OUTPATIENT
Start: 2024-07-04 | End: 2024-07-19

## 2024-07-03 RX ORDER — PALIPERIDONE 6 MG/1
6 TABLET, EXTENDED RELEASE ORAL DAILY
Qty: 15 TABLET | Refills: 0 | Status: SHIPPED | OUTPATIENT
Start: 2024-07-04 | End: 2024-07-19

## 2024-07-03 RX ADMIN — PALIPERIDONE 6 MG: 6 TABLET, EXTENDED RELEASE ORAL at 09:26

## 2024-07-03 RX ADMIN — HYDROCORTISONE: 0.01 CREAM TOPICAL at 09:29

## 2024-07-03 RX ADMIN — BUPRENORPHINE AND NALOXONE 1 TABLET: 8; 2 TABLET SUBLINGUAL at 09:26

## 2024-07-03 RX ADMIN — DIVALPROEX SODIUM 500 MG: 500 TABLET, EXTENDED RELEASE ORAL at 09:26

## 2024-07-03 NOTE — GROUP NOTE
Pt did not attend.                                                                       Group Therapy Note    Date: 7/3/2024    Group Start Time: 1155  Group End Time: 1215  Group Topic: Group Therapy    [facility] 3W Ami Stevenson  Patients talked about one thing they are grateful for, informed about the mental and physical benefits of gratitude, and asked to write a gratitude journal entry.                Signature:  Ami Faith

## 2024-07-03 NOTE — GROUP NOTE
Group Therapy Note    Date: 7/3/2024    Group Start Time: 1000  Group End Time: 1100  Group Topic: Art Therapy     [facility] 3W Kathie Muñoz, VENANCIOW        Group Therapy Note    Attendees: 12       Patient's Goal:  To participate in morning group art therapy.     Notes:  Patient attended the morning group art therapy session. He engaged in drawing a symbolic image of good and bad conscience. Patient indicated that he tries to make good choices in good conscience. He talked a bit about his children and their mother. Patient was discharged focused and excited about his pending discharge.     Status After Intervention:  Improved    Participation Level: Active Listener    Participation Quality: Appropriate      Speech:  normal      Thought Process/Content: Logical      Affective Functioning: Congruent      Mood: elevated      Level of consciousness:  Alert      Response to Learning: Able to verbalize current knowledge/experience      Endings: None Reported    Modes of Intervention: Activity      Discipline Responsible: Psychoeducational Specialist      Signature:  Kathie Meier MA ATR

## 2024-07-03 NOTE — DISCHARGE INSTRUCTIONS
Someone from [facility] will be calling you tomorrow to follow up on your care. If you don't hear from us, give us a call! [phone].    Keep all follow up appointments, take medications as ordered, utilize positive supports, abstain from use of alcohol and drugs. If symptoms return or you feel at risk to yourself or others, please call 911, return the nearest emergency room, or call your local crisis hotline:  [facility]: [phone]  [facility]: [phone]  [facility]: [phone]    Due to the Covid-19 Pandemic, [facility] is not currently available. For assistance with quitting smoking please go to https://smokefree.gov. A prescription for an FDA-approved tobacco cessation medication was offered at discharge and the patient refused.    You were offered a flu vaccine but declined.

## 2024-07-03 NOTE — PLAN OF CARE
Problem: Anxiety  Goal: Will report anxiety at manageable levels  7/2/2024 2154 by Alie Vela RN  Outcome: Progressing  7/2/2024 1039 by Yuki Stevens RN  Outcome: Progressing     Problem: Coping  Goal: Pt/Family able to verbalize concerns and demonstrate effective coping strategies  7/2/2024 2154 by Alie Vela RN  Outcome: Progressing  7/2/2024 1039 by Yuki Stevens RN  Outcome: Progressing     Problem: Depression/Self Harm  Goal: Effect of psychiatric condition will be minimized and patient will be protected from self harm  7/2/2024 2154 by Alie Vela RN  Outcome: Progressing  7/2/2024 1039 by Yuki Stevens RN  Outcome: Progressing     Problem: Involuntary Admit  Goal: Will cooperate with staff recommendations and doctor's orders and will demonstrate appropriate behavior  7/2/2024 2154 by Alie Vela RN  Outcome: Progressing  7/2/2024 1039 by Yuki Stevens RN  Outcome: Progressing     Problem: Risk for Elopement  Goal: Patient will not exit the unit/facility without proper excort  7/2/2024 2154 by Alie Vela RN  Outcome: Progressing  7/2/2024 1039 by Yuki Stevens RN  Outcome: Progressing     Problem: Discharge Planning  Goal: Discharge to home or other facility with appropriate resources  7/2/2024 2154 by Alie Vela RN  Outcome: Progressing  7/2/2024 1039 by Yuki Stevens RN  Outcome: Progressing

## 2024-07-03 NOTE — GROUP NOTE
Group Therapy Note    Date: 7/2/2024    Group Start Time: 2000  Group End Time: 2030  Group Topic: Wrap-Up    [facility] 3W Katey Ruelas        Group Therapy Note    Attendees: 11/24       Notes:  Pt did not attend.     Modes of Intervention: Activity      Discipline Responsible: Behavorial Health Tech      Signature:  Katey Vogt

## 2024-07-03 NOTE — PROGRESS NOTES
[facility]  BEHAVIORAL HEALTH FOLLOW-UP NOTE          Patient was seen and examined in person, Chart reviewed   Patient's case discussed with staff/team    Chief Complaint: depression    Interim History:     AH = derogatory content  Has been secluding in his room  Pt has tried invega and depakote in the past  Less paranoid thoughts  Appetite:   [] Normal/Unchanged  [] Increased  [x] Decreased      Sleep:       [] Normal/Unchanged  [x] Fair       [] Poor              Energy:    [] Normal/Unchanged  [] Increased  [x] Decreased        SI [] Present  [] Absent    HI  []Present  [] Absent     Aggression:  [] yes  [] no    Patient is [] able  [x] unable to CONTRACT FOR SAFETY     PAST MEDICAL/PSYCHIATRIC HISTORY:   Past Medical History:   Diagnosis Date    Drug abuse (HCC)     Hyperlipidemia     Psychiatric problem        FAMILY/SOCIAL HISTORY:  Family History   Problem Relation Age of Onset    No Known Problems Mother     No Known Problems Father     No Known Problems Sister     No Known Problems Brother     No Known Problems Maternal Aunt     No Known Problems Maternal Uncle     No Known Problems Paternal Aunt     No Known Problems Paternal Uncle     No Known Problems Maternal Grandmother     No Known Problems Maternal Grandfather     No Known Problems Paternal Grandmother     No Known Problems Paternal Grandfather     No Known Problems Maternal Cousin     No Known Problems Paternal Cousin      Social History     Socioeconomic History    Marital status: Single     Spouse name: Not on file    Number of children: 2    Years of education: 12    Highest education level: Not on file   Occupational History    Not on file   Tobacco Use    Smoking status: Every Day     Current packs/day: 0.50     Types: Cigarettes    Smokeless tobacco: Never   Vaping Use    Vaping Use: Every day    Substances: Nicotine, THC    Devices: Disposable, Pre-filled or refillable cartridge   Substance and Sexual Activity    
Discharge instructions given verbally and in writing. All questions addressed. Patient  stated understanding of instructions. Personal belongings listed was stated by patient to be correct and pt signed as such. All personal belongings will be returned as patient exits the unit.    Patient was offered but declined flu vaccine.Pateint ambulated off unit with steady gait to transport home by family  
Explained and gave am meds, subox 8.2 mg sl staff in view until dissolved.   
Explained and gave subox 8.2 , sl , with staff in view until dissolved, pt expressed irritability when this staff nurse asked about sleep, pt is stated he  hasn't slept in 3 weeks at home and that why he's catching up on his sleep here, pt reports he has a MJ card and gets his thc-MJ  from a lab, pt  is unable to say if Depakote is working now as pt stated before he wasn't taking at home because it wasn't working. Pt reports being sober from drugs for 3 years.   
Found pt in bed, explained and gave am meds, gave subox 8.2 sl ,pt in view of staff till dissolved, pt reports minimal depression anxiety suicidal thoughts #1, denied voices , pt reports sleep is much better, pt talked about his family , noted to smile   
Gave subox 8.2 sl until dissolved, pt looked at wrapper, reports feeling a bit disorganized, appears worried, stated he is to get a depak lab tomorrow then discharged,   
Katia contacted for valporic acid level in AM. See Order.   
On a scale 1 through 10, with #10 beng the highest, the patient rates his anxiety a #8/10 and his depression a #5/10. The patient denies SI/HI and contracts for safety on the unit. The patient endorses auditory hallucinations of voices. When asked about the voices, the patient stated, \" They say things like I'm going to get it together and get out of here\". The patient denies visual hallucinations. The patient reports increased sleep, fair appetite, and taking a shower today. The patient has flat affect and isolates to his room.  
PT ARRIVED VIA WHEELCHAIR AT 0518  
Patient arrived to unit via wheelchair and was passively searched for contraband. No contraband found. Patient skin assessed by this writer and Dolly CARTER. Skin intact.  
Patient did not attend [facility] despite staff encouragement.        Electronically signed by Oriana Medeiros on 6/30/2024 at 6:14 PM   
Patient did not attend [id number] [facility] despite staff encouragement.        Electronically signed by Oriana Medeiros on 6/30/2024 at 7:06 PM   
Pt is noted resting in bed, able to awaken now for am meds, they were explained , pt reports taking invega  at home, as he hears voices and they were increasing is why he is here, reports bringing his subox with him, takes 8mg bid for over a year now and is asking for it, stated he will get sick if he doesn't get it, reports its for being addicted to pain pills.reports depression and anxiety, voices that are bothering him, denied any suicidal thoughts. Pt expressed he wants off of suband has plans of getting a injection instead of subox,   
Pt is sleeping in bed, able to awaken and explained am meds, pt reports depression and anxiety #5, denied voices today, denied suicidal thoughts, appears sad ,poor eye contact,  no energy ,wakes up freq from sleep at night, groups were encouraged and pt stated he would attend them.  
Pt remains sleeping resp even unlabored  
Report per Ofelia CARTER,Pt is a 35 yo male who brought himself to the ED with increased auditory hallucinations, anxiety and depression. Pt stated he continually hears voices saying \"you need to get it over with\". Pt stated he wants it to stop. Per chart review, pt reported having PTSD, experienced the death of his brother 9/2019 d/t TBI from an assault. Pt stated he also sees shadows. Pt denies SI/HI/VH. Pt stated he is linked with [facility] and is medication compliant. Pt rates anxiety and depression 9/10. Pt positive for THC, but has a hx of polysubstance abuse. Pt stated he wants help with AH, anxiety and depression and believes admission to 3 would be beneficial. THC+,ETOH-,VPA 2.8. PT being admitted per Dr Carlos,Dx Bipolar Depression,Psychosis. Emergency admit  
Worsening  [] No change      Diagnosis:   Principal Problem:    Schizoaffective disorder, bipolar type (HCC)  Resolved Problems:    * No resolved hospital problems. *      LABS:    No results for input(s): \"WBC\", \"HGB\", \"PLT\" in the last 72 hours.  No results for input(s): \"NA\", \"K\", \"CL\", \"CO2\", \"BUN\", \"CREATININE\", \"GLUCOSE\" in the last 72 hours.  Recent Labs     06/30/24 1949   BILITOT 0.3   ALKPHOS 85   AST 12   ALT 9     Lab Results   Component Value Date/Time    BARBSCNU Neg 06/28/2024 11:30 PM    LABBENZ Neg 06/28/2024 11:30 PM    LABMETH Neg 06/28/2024 11:30 PM    ETOH <10 06/28/2024 11:59 PM     Lab Results   Component Value Date/Time    TSH 2.700 06/28/2024 11:59 PM     No results found for: \"LITHIUM\"  Lab Results   Component Value Date    VALPROATE <2.8 (L) 06/28/2024         Treatment Plan:  Reviewed current Medications with the patient.   Medication as ordered  Depakote level tomorrow  Risks, benefits, side effects, drug-to-drug interactions and alternatives to treatment were discussed.  Collateral information:   CD evaluation  Encourage patient to attend group and other milieu activities.  Discharge planning discussed with the patient and treatment team.    PSYCHOTHERAPY/COUNSELING:  [x] Therapeutic interview  [x] Supportive  [] CBT  [] Ongoing  [] Other  Patient was seen 1:1 for 20 minutes, other than E&M time spent, focusing on      - coping skills techniques     - Anxiety management techniques discussed including deep breathing exercise and PMR     - discussing patients strength and weakness       [x] Patient continues to need, on a daily basis, active treatment furnished directly by or requiring the supervision of inpatient psychiatric personnel      Anticipated Length of stay: Thursday if possible         Electronically signed by SUE SHAH MD on 7/2/2024 at 10:14 AM

## 2024-07-03 NOTE — TRANSITION OF CARE
vaccine 03/28/1991, 08/20/1991    Poliovirus, IPOL, (age 6w+), SC/IM, 0.5mL 1990, 1990, 10/22/1991, 09/16/1995    Td vaccine (adult) 09/11/2003     Influenza Vaccination Status: Documentation of patient's or caregiver's refusal of influenza vaccine.    Screening for Metabolic Disorders for Patients on Antipsychotic Medications  (Data obtained from the EMR)    Estimated Body Mass Index  Body mass index is 26.68 kg/m².      Vital Signs/Blood Pressure  /77   Pulse 97   Temp 98.2 °F (36.8 °C) (Oral)   Resp 18   Ht 1.7 m (5' 6.93\")   Wt 77.1 kg (170 lb)   SpO2 100%   BMI 26.68 kg/m²      Fasting Blood Glucose or Hemoglobin A1c  No results found for: \"GLU\", \"GLUCPOC\"    Hemoglobin A1C   Date Value Ref Range Status   06/28/2024 5.4 4.0 - 6.0 % Final       Discharge Diagnosis: Major depression    Discharge Plan/Destination: [facility]      Discharge Medication List and Instructions:      Medication List        CHANGE how you take these medications      divalproex 500 MG extended release tablet  Commonly known as: DEPAKOTE ER  Take 1 tablet by mouth daily for 15 days  Start taking on: July 4, 2024  What changed: when to take this     paliperidone 6 MG extended release tablet  Commonly known as: INVEGA  Take 1 tablet by mouth daily for 15 days  Start taking on: July 4, 2024  What changed: Another medication with the same name was removed. Continue taking this medication, and follow the directions you see here.            STOP taking these medications      clotrimazole-betamethasone 1-0.05 % cream  Commonly known as: Lotrisone     traZODone 50 MG tablet  Commonly known as: DESYREL               Where to Get Your Medications        These medications were sent to [facility] [place], [place] - [address] -  [phone] - F [phone] [address] [place] [address]      Phone: [phone]   divalproex 500 MG extended release tablet  paliperidone 6 MG extended release tablet         Unresulted

## 2024-07-03 NOTE — DISCHARGE INSTR - DIET
Good nutrition is important when healing from an illness, injury, or surgery.  Follow any nutrition recommendations given to you during your hospital stay.   If you were given an oral nutrition supplement while in the hospital, continue to take this supplement at home.  You can take it with meals, in-between meals, and/or before bedtime. These supplements can be purchased at most local grocery stores, pharmacies, and chain [employer]-stores.   If you have any questions about your diet or nutrition, call the hospital and ask for the dietitian.  Resume as tolerated.

## 2024-07-04 NOTE — DISCHARGE SUMMARY
follow the directions you see here.            STOP taking these medications      clotrimazole-betamethasone 1-0.05 % cream  Commonly known as: Lotrisone     traZODone 50 MG tablet  Commonly known as: DESYREL               Where to Get Your Medications        These medications were sent to [facility] [place], [place] - [address] -  [phone] - F [phone] [address] [facility] [address]      Phone: [phone]   divalproex 500 MG extended release tablet  paliperidone 6 MG extended release tablet           Reason for more than one antipsychotic:   [x] N/A  [] 3 failed monotherapy(drugs tried):  [] Cross over to a new antipsychotic  [] Taper to monotherapy from polypharmacy  [] Augmentation of Clozapine therapy due to treatment resistance to single therapy        TIME SPEND - 35 MINUTES TO COMPLETE THE EVALUATION, DISCHARGE SUMMARY, MEDICATION RECONCILIATION AND FOLLOW UP CARE     Signed:  Thanh Pedro MD  7/3/2024  10:44 PM

## 2024-07-25 ENCOUNTER — HOSPITAL ENCOUNTER (EMERGENCY)
Age: 34
Discharge: HOME | End: 2024-07-25
Attending: EMERGENCY MEDICINE
Payer: COMMERCIAL

## 2024-07-25 ENCOUNTER — APPOINTMENT (OUTPATIENT)
Dept: GENERAL RADIOLOGY | Age: 34
End: 2024-07-25
Payer: COMMERCIAL

## 2024-07-25 VITALS
OXYGEN SATURATION: 98 % | RESPIRATION RATE: 15 BRPM | DIASTOLIC BLOOD PRESSURE: 60 MMHG | WEIGHT: 155 LBS | BODY MASS INDEX: 24.91 KG/M2 | HEART RATE: 79 BPM | SYSTOLIC BLOOD PRESSURE: 112 MMHG | TEMPERATURE: 98 F | HEIGHT: 66 IN

## 2024-07-25 DIAGNOSIS — R11.2 NAUSEA AND VOMITING, UNSPECIFIED VOMITING TYPE: Primary | ICD-10-CM

## 2024-07-25 DIAGNOSIS — R10.9 FLANK PAIN: ICD-10-CM

## 2024-07-25 LAB
ALBUMIN SERPL-MCNC: 4.8 G/DL (ref 3.5–4.6)
ALP SERPL-CCNC: 102 U/L (ref 35–104)
ALT SERPL-CCNC: 19 U/L (ref 0–41)
ANION GAP SERPL CALCULATED.3IONS-SCNC: 17 MEQ/L (ref 9–15)
AST SERPL-CCNC: 22 U/L (ref 0–40)
BACTERIA URNS QL MICRO: NEGATIVE /HPF
BASOPHILS # BLD: 0 K/UL (ref 0–0.2)
BASOPHILS NFR BLD: 0.2 %
BILIRUB SERPL-MCNC: 0.6 MG/DL (ref 0.2–0.7)
BILIRUB UR QL STRIP: NEGATIVE
BUN SERPL-MCNC: 14 MG/DL (ref 6–20)
CALCIUM SERPL-MCNC: 9.8 MG/DL (ref 8.5–9.9)
CHLORIDE SERPL-SCNC: 98 MEQ/L (ref 95–107)
CLARITY UR: CLEAR
CO2 SERPL-SCNC: 24 MEQ/L (ref 20–31)
COLOR UR: ABNORMAL
CREAT SERPL-MCNC: 1.02 MG/DL (ref 0.7–1.2)
EOSINOPHIL # BLD: 0 K/UL (ref 0–0.7)
EOSINOPHIL NFR BLD: 0 %
EPI CELLS #/AREA URNS AUTO: ABNORMAL /HPF (ref 0–5)
ERYTHROCYTE [DISTWIDTH] IN BLOOD BY AUTOMATED COUNT: 13.2 % (ref 11.5–14.5)
GLOBULIN SER CALC-MCNC: 2.8 G/DL (ref 2.3–3.5)
GLUCOSE SERPL-MCNC: 124 MG/DL (ref 70–99)
GLUCOSE UR STRIP-MCNC: NEGATIVE MG/DL
HCT VFR BLD AUTO: 44.6 % (ref 42–52)
HGB BLD-MCNC: 14.8 G/DL (ref 14–18)
HGB UR QL STRIP: NEGATIVE
HYALINE CASTS #/AREA URNS AUTO: ABNORMAL /HPF (ref 0–5)
KETONES UR STRIP-MCNC: >=80 MG/DL
LACTATE BLDV-SCNC: 1.7 MMOL/L (ref 0.5–2.2)
LEUKOCYTE ESTERASE UR QL STRIP: NEGATIVE
LIPASE SERPL-CCNC: 11 U/L (ref 12–95)
LYMPHOCYTES # BLD: 0.5 K/UL (ref 1–4.8)
LYMPHOCYTES NFR BLD: 5.6 %
MAGNESIUM SERPL-MCNC: 2.5 MG/DL (ref 1.7–2.4)
MCH RBC QN AUTO: 29.8 PG (ref 27–31.3)
MCHC RBC AUTO-ENTMCNC: 33.2 % (ref 33–37)
MCV RBC AUTO: 89.7 FL (ref 79–92.2)
MONOCYTES # BLD: 0.3 K/UL (ref 0.2–0.8)
MONOCYTES NFR BLD: 2.9 %
NEUTROPHILS # BLD: 8 K/UL (ref 1.4–6.5)
NEUTS SEG NFR BLD: 91 %
NITRITE UR QL STRIP: NEGATIVE
PH UR STRIP: 5.5 [PH] (ref 5–9)
PLATELET # BLD AUTO: 209 K/UL (ref 130–400)
POTASSIUM SERPL-SCNC: 3.8 MEQ/L (ref 3.4–4.9)
PROT SERPL-MCNC: 7.6 G/DL (ref 6.3–8)
PROT UR STRIP-MCNC: 30 MG/DL
RBC # BLD AUTO: 4.97 M/UL (ref 4.7–6.1)
RBC #/AREA URNS AUTO: ABNORMAL /HPF (ref 0–5)
SARS-COV-2 RDRP RESP QL NAA+PROBE: NOT DETECTED
SODIUM SERPL-SCNC: 139 MEQ/L (ref 135–144)
SP GR UR STRIP: 1.03 (ref 1–1.03)
T4 FREE SERPL-MCNC: 1.48 NG/DL (ref 0.84–1.68)
TSH SERPL-MCNC: 0.97 UIU/ML (ref 0.44–3.86)
URINE REFLEX TO CULTURE: ABNORMAL
UROBILINOGEN UR STRIP-ACNC: 0.2 E.U./DL
WBC # BLD AUTO: 8.8 K/UL (ref 4.8–10.8)
WBC #/AREA URNS AUTO: ABNORMAL /HPF (ref 0–5)

## 2024-07-25 PROCEDURE — 81001 URINALYSIS AUTO W/SCOPE: CPT

## 2024-07-25 PROCEDURE — 83735 ASSAY OF MAGNESIUM: CPT

## 2024-07-25 PROCEDURE — 71045 X-RAY EXAM CHEST 1 VIEW: CPT

## 2024-07-25 PROCEDURE — 96374 THER/PROPH/DIAG INJ IV PUSH: CPT

## 2024-07-25 PROCEDURE — 36415 COLL VENOUS BLD VENIPUNCTURE: CPT

## 2024-07-25 PROCEDURE — 96361 HYDRATE IV INFUSION ADD-ON: CPT

## 2024-07-25 PROCEDURE — 93005 ELECTROCARDIOGRAM TRACING: CPT | Performed by: EMERGENCY MEDICINE

## 2024-07-25 PROCEDURE — 87635 SARS-COV-2 COVID-19 AMP PRB: CPT

## 2024-07-25 PROCEDURE — 83690 ASSAY OF LIPASE: CPT

## 2024-07-25 PROCEDURE — 85025 COMPLETE CBC W/AUTO DIFF WBC: CPT

## 2024-07-25 PROCEDURE — 99285 EMERGENCY DEPT VISIT HI MDM: CPT

## 2024-07-25 PROCEDURE — 87040 BLOOD CULTURE FOR BACTERIA: CPT

## 2024-07-25 PROCEDURE — 84443 ASSAY THYROID STIM HORMONE: CPT

## 2024-07-25 PROCEDURE — 80053 COMPREHEN METABOLIC PANEL: CPT

## 2024-07-25 PROCEDURE — 84439 ASSAY OF FREE THYROXINE: CPT

## 2024-07-25 PROCEDURE — 83605 ASSAY OF LACTIC ACID: CPT

## 2024-07-25 PROCEDURE — 6360000002 HC RX W HCPCS: Performed by: EMERGENCY MEDICINE

## 2024-07-25 PROCEDURE — 2580000003 HC RX 258: Performed by: EMERGENCY MEDICINE

## 2024-07-25 RX ORDER — ONDANSETRON 2 MG/ML
4 INJECTION INTRAMUSCULAR; INTRAVENOUS ONCE
Status: COMPLETED | OUTPATIENT
Start: 2024-07-25 | End: 2024-07-25

## 2024-07-25 RX ORDER — 0.9 % SODIUM CHLORIDE 0.9 %
1000 INTRAVENOUS SOLUTION INTRAVENOUS ONCE
Status: COMPLETED | OUTPATIENT
Start: 2024-07-25 | End: 2024-07-25

## 2024-07-25 RX ORDER — SODIUM CHLORIDE 9 MG/ML
INJECTION, SOLUTION INTRAVENOUS CONTINUOUS
Status: DISCONTINUED | OUTPATIENT
Start: 2024-07-25 | End: 2024-07-25 | Stop reason: HOSPADM

## 2024-07-25 RX ORDER — ONDANSETRON 4 MG/1
8 TABLET, ORALLY DISINTEGRATING ORAL EVERY 6 HOURS PRN
Qty: 21 TABLET | Refills: 0 | Status: SHIPPED | OUTPATIENT
Start: 2024-07-25 | End: 2024-07-28

## 2024-07-25 RX ADMIN — ONDANSETRON 4 MG: 2 INJECTION INTRAMUSCULAR; INTRAVENOUS at 12:37

## 2024-07-25 RX ADMIN — SODIUM CHLORIDE 1000 ML: 9 INJECTION, SOLUTION INTRAVENOUS at 12:37

## 2024-07-25 RX ADMIN — SODIUM CHLORIDE: 9 INJECTION, SOLUTION INTRAVENOUS at 13:44

## 2024-07-25 NOTE — ED PROVIDER NOTES
HISTORY       Family History   Problem Relation Age of Onset    No Known Problems Mother     No Known Problems Father     No Known Problems Sister     No Known Problems Brother     No Known Problems Maternal Aunt     No Known Problems Maternal Uncle     No Known Problems Paternal Aunt     No Known Problems Paternal Uncle     No Known Problems Maternal Grandmother     No Known Problems Maternal Grandfather     No Known Problems Paternal Grandmother     No Known Problems Paternal Grandfather     No Known Problems Maternal Cousin     No Known Problems Paternal Cousin           SOCIAL HISTORY       Social History     Socioeconomic History    Marital status: Single     Spouse name: None    Number of children: 2    Years of education: 12    Highest education level: None   Tobacco Use    Smoking status: Every Day     Current packs/day: 0.50     Types: Cigarettes    Smokeless tobacco: Never   Vaping Use    Vaping Use: Every day    Substances: Nicotine, THC    Devices: Disposable, Pre-filled or refillable cartridge   Substance and Sexual Activity    Alcohol use: Not Currently     Alcohol/week: 3.0 standard drinks of alcohol     Types: 3 Cans of beer per week     Comment: occasionally    Drug use: Yes     Types: Marijuana (Weed)    Sexual activity: Yes     Partners: Female     Social Determinants of Health     Food Insecurity: No Food Insecurity (6/29/2024)    Hunger Vital Sign     Worried About Running Out of Food in the Last Year: Never true     Ran Out of Food in the Last Year: Never true   Transportation Needs: No Transportation Needs (6/29/2024)    PRAPARE - Transportation     Lack of Transportation (Medical): No     Lack of Transportation (Non-Medical): No    Social Connections ([facility] HRSN)   Housing Stability: Low Risk  (6/29/2024)    Housing Stability Vital Sign     Unable to Pay for Housing in the Last Year: No     Number of Places Lived in the Last Year: 1     Unstable Housing in the Last Year: No       SCREENINGS

## 2024-07-25 NOTE — ED NOTES
Patient has been calmer now that he has been laying down. Patient advised he is on suboxone during last interaction. Call light within reach, no questions at this time.

## 2024-07-25 NOTE — ED NOTES
Patient arrived to room from triage, patient advises he is very cold and nauseous. Blankets were given and cardiac monitoring was started. Patient reports BL flank pain as well as stomach pain at 6/10.

## 2024-07-26 LAB
BACTERIA BLD CULT ORG #2: NORMAL
BACTERIA BLD CULT: NORMAL
EKG ATRIAL RATE: 58 BPM
EKG P AXIS: 60 DEGREES
EKG P-R INTERVAL: 122 MS
EKG Q-T INTERVAL: 416 MS
EKG QRS DURATION: 86 MS
EKG QTC CALCULATION (BAZETT): 408 MS
EKG R AXIS: 84 DEGREES
EKG T AXIS: 58 DEGREES
EKG VENTRICULAR RATE: 58 BPM

## 2024-07-28 ENCOUNTER — APPOINTMENT (OUTPATIENT)
Dept: CT IMAGING | Age: 34
End: 2024-07-28
Payer: COMMERCIAL

## 2024-07-28 ENCOUNTER — HOSPITAL ENCOUNTER (EMERGENCY)
Age: 34
Discharge: HOME | End: 2024-07-28
Payer: COMMERCIAL

## 2024-07-28 VITALS
SYSTOLIC BLOOD PRESSURE: 130 MMHG | OXYGEN SATURATION: 97 % | HEART RATE: 66 BPM | WEIGHT: 155 LBS | RESPIRATION RATE: 16 BRPM | HEIGHT: 66 IN | DIASTOLIC BLOOD PRESSURE: 90 MMHG | BODY MASS INDEX: 24.91 KG/M2 | TEMPERATURE: 99 F

## 2024-07-28 DIAGNOSIS — N20.0 KIDNEY STONE: Primary | ICD-10-CM

## 2024-07-28 LAB
ALBUMIN SERPL-MCNC: 4.1 G/DL (ref 3.5–4.6)
ALP SERPL-CCNC: 81 U/L (ref 35–104)
ALT SERPL-CCNC: 24 U/L (ref 0–41)
ANION GAP SERPL CALCULATED.3IONS-SCNC: 13 MEQ/L (ref 9–15)
AST SERPL-CCNC: 28 U/L (ref 0–40)
BACTERIA URNS QL MICRO: NEGATIVE /HPF
BASOPHILS # BLD: 0 K/UL (ref 0–0.2)
BASOPHILS NFR BLD: 0.6 %
BILIRUB SERPL-MCNC: 0.4 MG/DL (ref 0.2–0.7)
BILIRUB UR QL STRIP: NEGATIVE
BUN SERPL-MCNC: 10 MG/DL (ref 6–20)
CALCIUM SERPL-MCNC: 8.7 MG/DL (ref 8.5–9.9)
CHLORIDE SERPL-SCNC: 107 MEQ/L (ref 95–107)
CLARITY UR: ABNORMAL
CO2 SERPL-SCNC: 23 MEQ/L (ref 20–31)
COLOR UR: ABNORMAL
CREAT SERPL-MCNC: 0.96 MG/DL (ref 0.7–1.2)
EOSINOPHIL # BLD: 0.1 K/UL (ref 0–0.7)
EOSINOPHIL NFR BLD: 1 %
EPI CELLS #/AREA URNS AUTO: ABNORMAL /HPF (ref 0–5)
ERYTHROCYTE [DISTWIDTH] IN BLOOD BY AUTOMATED COUNT: 13.4 % (ref 11.5–14.5)
GLOBULIN SER CALC-MCNC: 2.1 G/DL (ref 2.3–3.5)
GLUCOSE SERPL-MCNC: 118 MG/DL (ref 70–99)
GLUCOSE UR STRIP-MCNC: NEGATIVE MG/DL
HCT VFR BLD AUTO: 38.2 % (ref 42–52)
HGB BLD-MCNC: 12.8 G/DL (ref 14–18)
HGB UR QL STRIP: ABNORMAL
HYALINE CASTS #/AREA URNS AUTO: ABNORMAL /HPF (ref 0–5)
KETONES UR STRIP-MCNC: ABNORMAL MG/DL
LEUKOCYTE ESTERASE UR QL STRIP: ABNORMAL
LIPASE SERPL-CCNC: 18 U/L (ref 12–95)
LYMPHOCYTES # BLD: 1.5 K/UL (ref 1–4.8)
LYMPHOCYTES NFR BLD: 22.4 %
MCH RBC QN AUTO: 30.1 PG (ref 27–31.3)
MCHC RBC AUTO-ENTMCNC: 33.5 % (ref 33–37)
MCV RBC AUTO: 89.9 FL (ref 79–92.2)
MONOCYTES # BLD: 0.5 K/UL (ref 0.2–0.8)
MONOCYTES NFR BLD: 7.2 %
NEUTROPHILS # BLD: 4.7 K/UL (ref 1.4–6.5)
NEUTS SEG NFR BLD: 68.7 %
NITRITE UR QL STRIP: NEGATIVE
PH UR STRIP: 5.5 [PH] (ref 5–9)
PLATELET # BLD AUTO: 150 K/UL (ref 130–400)
POTASSIUM SERPL-SCNC: 3.4 MEQ/L (ref 3.4–4.9)
PROT SERPL-MCNC: 6.2 G/DL (ref 6.3–8)
PROT UR STRIP-MCNC: 100 MG/DL
RBC # BLD AUTO: 4.25 M/UL (ref 4.7–6.1)
RBC #/AREA URNS AUTO: >100 /HPF (ref 0–5)
SODIUM SERPL-SCNC: 143 MEQ/L (ref 135–144)
SP GR UR STRIP: 1.04 (ref 1–1.03)
URINE REFLEX TO CULTURE: ABNORMAL
UROBILINOGEN UR STRIP-ACNC: 1 E.U./DL
WBC # BLD AUTO: 6.8 K/UL (ref 4.8–10.8)
WBC #/AREA URNS AUTO: ABNORMAL /HPF (ref 0–5)

## 2024-07-28 PROCEDURE — 96361 HYDRATE IV INFUSION ADD-ON: CPT

## 2024-07-28 PROCEDURE — 99284 EMERGENCY DEPT VISIT MOD MDM: CPT

## 2024-07-28 PROCEDURE — 80053 COMPREHEN METABOLIC PANEL: CPT

## 2024-07-28 PROCEDURE — 6360000002 HC RX W HCPCS: Performed by: PHYSICIAN ASSISTANT

## 2024-07-28 PROCEDURE — 36415 COLL VENOUS BLD VENIPUNCTURE: CPT

## 2024-07-28 PROCEDURE — 6370000000 HC RX 637 (ALT 250 FOR IP): Performed by: PHYSICIAN ASSISTANT

## 2024-07-28 PROCEDURE — 2580000003 HC RX 258: Performed by: PHYSICIAN ASSISTANT

## 2024-07-28 PROCEDURE — 83690 ASSAY OF LIPASE: CPT

## 2024-07-28 PROCEDURE — 85025 COMPLETE CBC W/AUTO DIFF WBC: CPT

## 2024-07-28 PROCEDURE — 96374 THER/PROPH/DIAG INJ IV PUSH: CPT

## 2024-07-28 PROCEDURE — 81001 URINALYSIS AUTO W/SCOPE: CPT

## 2024-07-28 PROCEDURE — 96375 TX/PRO/DX INJ NEW DRUG ADDON: CPT

## 2024-07-28 PROCEDURE — 74176 CT ABD & PELVIS W/O CONTRAST: CPT

## 2024-07-28 RX ORDER — IBUPROFEN 800 MG/1
800 TABLET ORAL EVERY 8 HOURS PRN
Qty: 20 TABLET | Refills: 0 | Status: SHIPPED | OUTPATIENT
Start: 2024-07-28

## 2024-07-28 RX ORDER — OXYCODONE HYDROCHLORIDE AND ACETAMINOPHEN 5; 325 MG/1; MG/1
1 TABLET ORAL EVERY 6 HOURS PRN
Qty: 12 TABLET | Refills: 0 | Status: SHIPPED | OUTPATIENT
Start: 2024-07-28 | End: 2024-07-31

## 2024-07-28 RX ORDER — ONDANSETRON 4 MG/1
4 TABLET, ORALLY DISINTEGRATING ORAL 3 TIMES DAILY PRN
Qty: 21 TABLET | Refills: 0 | Status: SHIPPED | OUTPATIENT
Start: 2024-07-28

## 2024-07-28 RX ORDER — 0.9 % SODIUM CHLORIDE 0.9 %
1000 INTRAVENOUS SOLUTION INTRAVENOUS ONCE
Status: COMPLETED | OUTPATIENT
Start: 2024-07-28 | End: 2024-07-28

## 2024-07-28 RX ORDER — ONDANSETRON 4 MG/1
4 TABLET, ORALLY DISINTEGRATING ORAL ONCE
Status: COMPLETED | OUTPATIENT
Start: 2024-07-28 | End: 2024-07-28

## 2024-07-28 RX ORDER — TAMSULOSIN HYDROCHLORIDE 0.4 MG/1
0.4 CAPSULE ORAL ONCE
Status: COMPLETED | OUTPATIENT
Start: 2024-07-28 | End: 2024-07-28

## 2024-07-28 RX ORDER — ONDANSETRON 2 MG/ML
4 INJECTION INTRAMUSCULAR; INTRAVENOUS ONCE
Status: COMPLETED | OUTPATIENT
Start: 2024-07-28 | End: 2024-07-28

## 2024-07-28 RX ORDER — KETOROLAC TROMETHAMINE 30 MG/ML
30 INJECTION, SOLUTION INTRAMUSCULAR; INTRAVENOUS ONCE
Status: COMPLETED | OUTPATIENT
Start: 2024-07-28 | End: 2024-07-28

## 2024-07-28 RX ORDER — TAMSULOSIN HYDROCHLORIDE 0.4 MG/1
0.4 CAPSULE ORAL DAILY
Qty: 10 CAPSULE | Refills: 0 | Status: SHIPPED | OUTPATIENT
Start: 2024-07-28 | End: 2024-08-07

## 2024-07-28 RX ORDER — MORPHINE SULFATE 4 MG/ML
4 INJECTION, SOLUTION INTRAMUSCULAR; INTRAVENOUS ONCE
Status: COMPLETED | OUTPATIENT
Start: 2024-07-28 | End: 2024-07-28

## 2024-07-28 RX ORDER — OXYCODONE HYDROCHLORIDE AND ACETAMINOPHEN 5; 325 MG/1; MG/1
1 TABLET ORAL ONCE
Status: COMPLETED | OUTPATIENT
Start: 2024-07-28 | End: 2024-07-28

## 2024-07-28 RX ADMIN — OXYCODONE AND ACETAMINOPHEN 1 TABLET: 5; 325 TABLET ORAL at 20:26

## 2024-07-28 RX ADMIN — MORPHINE SULFATE 4 MG: 4 INJECTION, SOLUTION INTRAMUSCULAR; INTRAVENOUS at 18:23

## 2024-07-28 RX ADMIN — TAMSULOSIN HYDROCHLORIDE 0.4 MG: 0.4 CAPSULE ORAL at 20:26

## 2024-07-28 RX ADMIN — ONDANSETRON 4 MG: 4 TABLET, ORALLY DISINTEGRATING ORAL at 20:26

## 2024-07-28 RX ADMIN — KETOROLAC TROMETHAMINE 30 MG: 30 INJECTION, SOLUTION INTRAMUSCULAR at 18:24

## 2024-07-28 RX ADMIN — SODIUM CHLORIDE 1000 ML: 9 INJECTION, SOLUTION INTRAVENOUS at 18:24

## 2024-07-28 RX ADMIN — ONDANSETRON 4 MG: 2 INJECTION INTRAMUSCULAR; INTRAVENOUS at 18:24

## 2024-07-28 ASSESSMENT — PAIN DESCRIPTION - FREQUENCY: FREQUENCY: CONTINUOUS

## 2024-07-28 ASSESSMENT — PAIN DESCRIPTION - LOCATION: LOCATION: ABDOMEN

## 2024-07-28 ASSESSMENT — PAIN DESCRIPTION - PAIN TYPE: TYPE: ACUTE PAIN

## 2024-07-28 ASSESSMENT — PAIN SCALES - GENERAL: PAINLEVEL_OUTOF10: 10

## 2024-07-28 ASSESSMENT — PAIN - FUNCTIONAL ASSESSMENT: PAIN_FUNCTIONAL_ASSESSMENT: 0-10

## 2024-07-28 ASSESSMENT — PAIN DESCRIPTION - ORIENTATION: ORIENTATION: RIGHT

## 2024-07-28 NOTE — ED PROVIDER NOTES
[facility] ED  eMERGENCYdEPARTMENT eNCOUnter        Pt Name: Tony Jones  MRN: [id number]  Birthdate 1990of evaluation: 7/28/2024  Provider:Tong Martinez PA-C  6:21 PM EDT    CHIEF COMPLAINT       Chief Complaint   Patient presents with    Flank Pain     R side flank pain          HISTORY OF PRESENT ILLNESS  (Location/Symptom, Timing/Onset, Context/Setting, Quality, Duration, Modifying Factors, Severity.)   Tony Jones is a 34 y.o. male who presents to the emergency department with complaints of right-sided abdominal pain that radiates into the right flank that began yesterday and is acutely worsened in severity throughout the course of the day today.  Patient states that \"it feels like I am constipated but I am not\".  Patient states that this feeling is similar to when he had a kidney stone several years prior.  Patient denies any dysuria.  Patient does state he has had multiple episodes of nausea and vomiting due to the pain.    HPI    Nursing Notes were reviewed and I agree.    REVIEW OF SYSTEMS    (2-9 systems for level 4, 10 or more for level 5)     Review of Systems   Constitutional:  Negative for fever.   Gastrointestinal:  Positive for abdominal pain, nausea and vomiting. Negative for diarrhea.   Genitourinary:  Positive for flank pain. Negative for dysuria.        as noted above the remainder of the review of systems was reviewed and negative.       PAST MEDICAL HISTORY     Past Medical History:   Diagnosis Date    Drug abuse (HCC)     Hyperlipidemia     Psychiatric problem          SURGICAL HISTORY       Past Surgical History:   Procedure Laterality Date    APPENDECTOMY           CURRENT MEDICATIONS       Previous Medications    DIVALPROEX (DEPAKOTE ER) 500 MG EXTENDED RELEASE TABLET    Take 1 tablet by mouth daily for 15 days    PALIPERIDONE (INVEGA) 6 MG EXTENDED RELEASE TABLET    Take 1 tablet by mouth daily for 15 days       ALLERGIES     Patient has no known allergies.    HISTORY

## 2024-07-28 NOTE — ED PROVIDER NOTES
Basic Information   Time Seen: 5:35 PM   Primary Care Provider: No primary care provider on file.     Chief Complaint   Patient presents with    Flank Pain     R side flank pain       HPI   Tony Jones is a 34 yrs male who presents with R flank pain that started this morning.  He is having nausea and vomiting x 3 today.  Possible hematuria.      Physical Exam     BP (!) 130/90 (07/28/24 1729)    Temp 99 °F (37.2 °C) (07/28/24 1729)    Pulse 66 (07/28/24 1729)   Resp 16 (07/28/24 1729)    SpO2 97 % (07/28/24 1729)       General: Awake and Alert, no acute distress   CV: RRR, S1, S2   Resp: LCTAB, even and non labored   Other:   Impression and Plan     Labs Reviewed   CBC WITH AUTO DIFFERENTIAL   COMPREHENSIVE METABOLIC PANEL   LIPASE   URINALYSIS WITH REFLEX TO CULTURE        CT ABDOMEN PELVIS WO CONTRAST Additional Contrast? None    (Results Pending)      Final Impression   I have performed a medical screening exam on Tony Jones. Based on this patient's chief complaint/symptoms of   Chief Complaint   Patient presents with    Flank Pain     R side flank pain     and my focused exam, their care will be started and transitioned to provider when room is available      Emerita Ventura, ZENON - CNP  07/28/24 3126

## 2024-07-28 NOTE — ED TRIAGE NOTES
Patient arrived via private car due to R side flank pain that is with vomiting and severe pain, he states he has a hx of kidney stones. States marijuana use 2 days ago, he also took 1000mg of Tylenol 8-10 hours ago.

## 2024-07-30 LAB
BACTERIA BLD CULT ORG #2: NORMAL
BACTERIA BLD CULT: NORMAL

## 2025-07-10 ENCOUNTER — HOSPITAL ENCOUNTER (EMERGENCY)
Age: 35
Discharge: HOME | End: 2025-07-10
Payer: COMMERCIAL

## 2025-07-10 VITALS
HEART RATE: 62 BPM | WEIGHT: 129.2 LBS | OXYGEN SATURATION: 98 % | TEMPERATURE: 98.5 F | DIASTOLIC BLOOD PRESSURE: 71 MMHG | SYSTOLIC BLOOD PRESSURE: 117 MMHG | BODY MASS INDEX: 20.76 KG/M2 | RESPIRATION RATE: 16 BRPM | HEIGHT: 66 IN

## 2025-07-10 DIAGNOSIS — F41.9 ANXIETY: Primary | ICD-10-CM

## 2025-07-10 PROCEDURE — 6370000000 HC RX 637 (ALT 250 FOR IP)

## 2025-07-10 PROCEDURE — 99283 EMERGENCY DEPT VISIT LOW MDM: CPT

## 2025-07-10 RX ORDER — HYDROXYZINE PAMOATE 50 MG/1
50 CAPSULE ORAL 3 TIMES DAILY PRN
Qty: 60 CAPSULE | Refills: 0 | Status: SHIPPED | OUTPATIENT
Start: 2025-07-10 | End: 2025-07-10

## 2025-07-10 RX ORDER — HYDROXYZINE PAMOATE 50 MG/1
50 CAPSULE ORAL 3 TIMES DAILY PRN
Qty: 30 CAPSULE | Refills: 0 | Status: SHIPPED | OUTPATIENT
Start: 2025-07-10

## 2025-07-10 RX ORDER — HYDROXYZINE PAMOATE 25 MG/1
50 CAPSULE ORAL ONCE
Status: COMPLETED | OUTPATIENT
Start: 2025-07-10 | End: 2025-07-10

## 2025-07-10 RX ADMIN — HYDROXYZINE PAMOATE 50 MG: 25 CAPSULE ORAL at 15:11

## 2025-07-10 ASSESSMENT — PAIN - FUNCTIONAL ASSESSMENT: PAIN_FUNCTIONAL_ASSESSMENT: NONE - DENIES PAIN

## 2025-07-10 NOTE — DISCHARGE INSTRUCTIONS
Take medications as directed.     Follow-up with [facility].     Return to ED if any new, or worsening symptoms.

## 2025-07-10 NOTE — ED TRIAGE NOTES
Pt states his anxiety is acting up   Pt states he has a lot of stress going on in his life   Pt denies si/hi  Pt states he missed work because of it today   Pt denies pain   Pt is afebrile   Pt states he isn't sleeping, eating, or drinking (much)

## 2025-07-10 NOTE — ED PROVIDER NOTES
REFERRED TO:  [facility]  [address] [facility] [id number] [phone]    On 7/14/2025      [facility] Emergency Department  [address] [address]  [phone]    If symptoms worsen      DISCHARGE MEDICATIONS:  Current Discharge Medication List        START taking these medications    Details   hydrOXYzine pamoate (VISTARIL) 50 MG capsule Take 1 capsule by mouth 3 times daily as needed for Anxiety  Qty: 30 capsule, Refills: 0           Controlled Substances Monitoring:          No data to display                (Please note that portions of this note were completed with a voice recognition program.  Efforts were made to edit the dictations but occasionally words are mis-transcribed.)    KODY Youssef (electronically signed)    Supervising Attending Physician: Dr. Snider.     Emely Gage, CONNER-C  07/10/25 0657

## 2025-07-10 NOTE — ED NOTES
Pt states his anxiety has improved after medications given. Pt states he is still anxious, but it is more tolerable.

## 2025-07-13 LAB
EKG ATRIAL RATE: 75 BPM
EKG DIAGNOSIS: NORMAL
EKG P AXIS: 76 DEGREES
EKG P-R INTERVAL: 128 MS
EKG Q-T INTERVAL: 364 MS
EKG QRS DURATION: 86 MS
EKG QTC CALCULATION (BAZETT): 406 MS
EKG R AXIS: 88 DEGREES
EKG T AXIS: 52 DEGREES
EKG VENTRICULAR RATE: 75 BPM